# Patient Record
Sex: FEMALE | Race: WHITE | NOT HISPANIC OR LATINO | URBAN - METROPOLITAN AREA
[De-identification: names, ages, dates, MRNs, and addresses within clinical notes are randomized per-mention and may not be internally consistent; named-entity substitution may affect disease eponyms.]

---

## 2021-08-10 ENCOUNTER — MEDICAL CORRESPONDENCE (OUTPATIENT)
Dept: HEALTH INFORMATION MANAGEMENT | Facility: CLINIC | Age: 71
End: 2021-08-10

## 2021-08-18 ENCOUNTER — REFERRAL (OUTPATIENT)
Dept: TRANSPLANT | Facility: CLINIC | Age: 71
End: 2021-08-18
Attending: STUDENT IN AN ORGANIZED HEALTH CARE EDUCATION/TRAINING PROGRAM

## 2021-08-18 VITALS — HEIGHT: 61 IN | WEIGHT: 180 LBS | BODY MASS INDEX: 33.99 KG/M2

## 2021-08-18 DIAGNOSIS — E11.9 DIABETES MELLITUS, TYPE 2 (H): ICD-10-CM

## 2021-08-18 DIAGNOSIS — Z76.82 ORGAN TRANSPLANT CANDIDATE: ICD-10-CM

## 2021-08-18 DIAGNOSIS — N18.4 CHRONIC KIDNEY DISEASE, STAGE IV (SEVERE) (H): ICD-10-CM

## 2021-08-18 DIAGNOSIS — Z01.818 PRE-TRANSPLANT EVALUATION FOR KIDNEY TRANSPLANT: ICD-10-CM

## 2021-08-18 DIAGNOSIS — I10 ESSENTIAL HYPERTENSION: ICD-10-CM

## 2021-08-18 DIAGNOSIS — I73.9 PAD (PERIPHERAL ARTERY DISEASE) (H): ICD-10-CM

## 2021-08-18 DIAGNOSIS — F17.200 CURRENT SMOKER: ICD-10-CM

## 2021-08-18 DIAGNOSIS — E78.5 HYPERLIPIDEMIA: ICD-10-CM

## 2021-08-18 DIAGNOSIS — N18.9 CHRONIC KIDNEY DISEASE: Primary | ICD-10-CM

## 2021-08-18 DIAGNOSIS — Z87.891 HISTORY OF TOBACCO USE: ICD-10-CM

## 2021-08-18 DIAGNOSIS — N18.9 CHRONIC RENAL FAILURE: ICD-10-CM

## 2021-08-18 ASSESSMENT — MIFFLIN-ST. JEOR: SCORE: 1278.85

## 2021-08-18 NOTE — TELEPHONE ENCOUNTER
PCP: SILVER ALARCON   Referring Provider: WHITNEY MOROCHO  Referring Diagnosis: CKD stage 4    GFR/Date: 16 (7/13/21)    Is patient under the age of 65? N  Is patient diabetic? Y  Is patient on insulin? Y  Was patient offered a pancreas transplant referral? N    Is patient in a group home/assisted living? N  Does patient have a guardian? N    Referral intake process completed.  Patient is aware that after financial approval is received, medical records will be requested.   Patient confirmed for a callback from transplant coordinator on 8/27/21. (within 2 weeks)  Tentative evaluation date 9/29/21. (within 4 weeks)    Confirmed coordinator will discuss evaluation process in more detail at the time of their call.   Patient is aware of the need to arrange age appropriate cancer screening, vaccinations, and dental care.  Reminded patient to complete questionnaire, complete medical records release, and review packet prior to evaluation visit .  Assessed patient for special needs (ie-wheelchair, assistance, guardian, and ):  No   Patient instructed to call [phone] with questions.     Patient gave verbal consent during intake call to obtain medical records and documents outside of [facility]:  YES

## 2021-08-18 NOTE — Clinical Note
Please see smart set orders for pt's pre K alone eval. Pt has confirmed she will attend virtual STD on 09/29/2021 - please just send schedule via My Chart. You do not need to call this patient. Thanks, Nohelia

## 2021-08-18 NOTE — LETTER
August 18, 2021    Sumi Zuñiga  [address] [address]      Dear Sumi,    Thank you for your interest in the Transplant Center at [facility]. We look forward to being a part of your care team and assisting you through the transplant process.    As we discussed, your transplant coordinator is Nohelia Fitzgerald (Kidney).  You may call your coordinator at any time with questions or concerns.  Your first scheduled call will be on 8/27/21 between 8-12pm.  If this needs to change, call [phone].    Please complete the following.    1. Fill out and return the enclosed forms    Authorization for Care Everywhere Release of Information    2. Sign up for:    "[employer] access to your electronic medical record (see enclosed pamphlet)    [email or web address], a transplant education website    You can use these tools to learn more about your transplant, communicate with your care team, and track your medical details      Sincerely,      Solid Organ Transplant  [facility]    cc: Referring Physician Dialysis Unit PCP Care Team

## 2021-08-20 ENCOUNTER — DOCUMENTATION ONLY (OUTPATIENT)
Dept: TRANSPLANT | Facility: CLINIC | Age: 71
End: 2021-08-20

## 2021-08-27 NOTE — TELEPHONE ENCOUNTER
Reviewed records for purpose of pre kidney transplant evaluation. Patient has CKD in the setting of diabetes type 2 - insulin dependent. No kidney biopsy found.  Other medical issues include severe peripheral vascular disease with h/o right calf claudication with popliteal artery stent placed 03/2009, left leg stent placed 05/2012. Patient again  experiencing claudication symptoms last fall so abdominal aortogram done 10/01/2020 with stentings/angioplasty done then LE angiogram done 10/09/2020 with further angioplasties. Is on chronic plavix/asprin. No mention of chronic heart issues, last stress test done 2012 - normal. No mention of chronic lung issues and no pulmonary related records found. Smoking x last 40 years - started when she was 20 years old. Dermatology appt 07/14/2021 for LE skin lesion - biopsied with results of porokeratosis/actinic keratosis - ? history of skin cancer. History of adrenal gland nodule - 2009.  Has cholethiasis without evidence of cholecystitis on abd US 07/14/2021. Mammogram 03/2021. Last PAP 2017. Colonoscopy done 2007 - result not available but would likely need repeat due to > 10 years ago. BMI is 35.5. Peripheral vascular disease is a major concern for transplant, but all okay at this time to proceed with pre kidney transplant evaluation.     Contacted patient and introduced myself as their Transplant Coordinator, also introduced the role of the Transplant Coordinator in the transplant process.  Explained the purpose of this call including reviewing next steps and answering questions.  Patient confirmed she is interested in proceeding with pre kidney transplant evaluation. Patient explained she was diagnosed with diabetes in her late 40's and has been on insulin for the last 10 years. States she currently takes a total of 138 units per 24 hours on average.  Patient explained she started smoking when she was 20 years old and remains smoking. Pt stating she does not have any lung  problems at all. Pt stating she does not have any heart issues and has never had and angiogram. Patient stating she has been told she does not need PAP smears any more.   Confirmed Referring Provider, Dialysis Center, and Primary Care Physician. Notified patient of the importance of continued communication with referring providers and primary care physicians.    Reviewed components of transplant evaluation process including necessary appointments, tests, and procedures.    Answered questions for patient regarding evaluation, provided my name and contact information and requested they call with any additional questions.    Determined that patient would like additional information regarding transplant by:     Drop Down choices: Mail, Email, MyChart, Phone Call   Encourage MyChart   Patient confirmed she wants to keep virtual STD of 09/29/2021. Explained a  will send her eval schedule via My Chart. Explained she will receive an email from Tess in our Office with 2 consents and patient educational materials - instructed to read all and I will review consents when I call her with the outcome of the Selection Committee review on October 6. Instructed patient on use of My Transplant Place and to view pre kidney eval parts 1 and 2. Patient expressed very good understanding of all and was in good agreement with the plan.   Smart set orders into Epic today for pre kidney alone eval - routed to .

## 2021-09-19 ENCOUNTER — HEALTH MAINTENANCE LETTER (OUTPATIENT)
Age: 71
End: 2021-09-19

## 2021-09-28 NOTE — PROGRESS NOTES
TRANSPLANT NEPHROLOGY RECIPIENT EVALUATION NOTE    Assessment and Plan:  # Kidney/Pancreas Transplant Evaluation: Patient is a marginal candidate overall. Benefits of a living donor transplant were discussed.    # CKD from presumed  diabetes mellitus type 2 and vascular disease: GFR 14, when ready she may benefit from a kidney transplant.     # DM Type 2: borderline control using 140 units of insulin daily.    # Cardiac Risk: no known history of cardiac disease.  Would need cardiac risk assessment and coronary angiogram given 20+ years of diabetes and other longstanding risk factors.    # Tobacco abuse: with a 50-pack-year history, still smoking 1 ppd. Would need low dose CT chest with PCP and PFTs.     # PAD:  s/p stenting to right popliteal artery in 2009, left SFA in 2012 and right popliteal / left popliteal/SFA in 10/2020. Multiple plasties as well. Previously on Plavix. + ongoing claudication sxs.  Will request most recent outside imaging.     # Actinic keratosis: should continue to follow with derm.     # Inactive adrenal nodule (1.4 x 1.6 cm ): size unchanged since 2009, with recent renin, aldosterone levels and 24 hour urine free cortisol all unremarkable. No further work up per endocrine.     # BMI 36 with central obesity.     #Frailty screening: would need in person surgery visit and frailty testing.     # Health Maintenance: Colonoscopy: Not up to date, Mammogram: Up to date, PAP: Not indicated and Dental: Not up to date    Discussed the risks and benefits of a transplant, including the risk of surgery and immunosuppression medications.  Patient's overall evaluation will be discussed in the Transplant Program's regular meeting with a final recommendation on the patients suitability for transplant to be made at that time.    Pending completion of the full evaluation, patient presently appears to be enough of an acceptable kidney transplant recipient candidate to have any potential kidney donors start the  evaluation process.  Patient was seen in conjunction with Dr. Larry Sarkar as part of a shared visit.    Evaluation:  Sumi Zuñiga was seen in consultation at the request of Dr. John Box for evaluation as a potential kidney/pancreas transplant recipient.    Reason for Visit:  Sumi Zuñiga is a 71 year old female with CKD from diabetes mellitus type 2, who presents for kidney/pancreas transplant evaluation.    History of Present Illness:         Kidney Disease Hx:    Sumi Zuñiga is a 71-year-old female with history of CKD secondary to presumed type 2 diabetes and renal vascular disease. CKD since 2016 and proteinuria that progressed from 10 to 18+ g/g last fall.  Negative serologies include SPEP and PLA2R. GFR less than 20 since 5/2021, more recently 14. She started an evaluation process with [facility] but was out of her network for insurance coverage.  She is AB0-B. No potential donors.       Primary Nephrologist: Dr. Dior        H/o Kidney Stones: No       H/o Recurrent/Frequent UTI: No         Diabetic Hx: Type 2        Diagnosis Date:  early 2000s         Medication History: treated with oral medications at first, then changed to insulin in 2009, currently using NPH 44 units before breakfast,  44 units before lunch and 50 units before dinner.        Diabetic Control: Borderline control (HbA1c 7-9%)   Last HbA1c: 7.9% (9/2021), formerly in the 8-10% range.        Hypoglycemic Unawareness: No       End-Organ Damage due to DM: Nephropathy and Peripheral neuropathy          Cardiac/Vascular Disease Risk Factors:        Cardiac Risk Factors: Diabetes, Hypertension, CKD, Peripheral Arterial Disease and Age (Male > 55, Female > 65)       Known CAD: No, stress test done 2012 - normal       Known PAD/Caludication Symptoms:  Yes, s/p stenting to right popliteal artery in 2009 , left SFA in 2012 and right popliteal / left popliteal/SFA in 10/2020. Previously on Plavix. Reports ongoing claudication sxs.       Known  Heart Failure: No       Arrhythmia: No       Pulmonary Hypertension: No       Valvular Disease: No       Other: None         Viral Serology Status       CMV IgG Antibody: Unknown       EBV IgG Antibody: Unknown         Volume Status/Weight:        Volume status: Not assessed       Weight:  BMI above guidelines       BMI: There is no height or weight on file to calculate BMI.         Functional Capacity/Frailty:       Lives with her son in [place].  Able to shop for 30 minutes, limited by back pain and claudication sxs. Denies exertional sxs with a flight of stairs.     Fatigue/Decreased Energy: [] No [x] Yes    Chest Pain or SOB with Exertion: [x] No [] Yes    Significant Weight Change: [x] No [] Yes    Nausea, Vomiting or Diarrhea: [x] No [] Yes    Fever, Sweats or Chills:  [x] No [] Yes    Leg Swelling [x] No [] Yes        History of Cancer: None    Other Significant Medical Issues:   - Tobacco abuse: 50 pack year, still smoking 1 ppd.   - h/o porokeratosis/actinic keratosis  - inactive adrenal nodule (1.4 x 1.6 cm ): sixe unchanged [id number], with recent rein and aldosterone levels and 24 hour urine free cortisol was unremarkable. No further work up per endocrine.   - BMI 36 with central obesity.       # COVID Vaccination Up To Date: Yes    Potential Living Kidney Donors: No    Review of Systems:  A comprehensive review of systems was obtained and negative, except as noted in the HPI or PMH.    Past Medical History:   Medical record was reviewed and PMH was discussed with patient and noted below.  Past Medical History:   Diagnosis Date     Chronic kidney disease      Current smoker     Started smoking at age 20 years old     Diabetes mellitus, type 2 (H)      Hypertension     takes medication     PAD (peripheral artery disease) (H)        Past Social History:   Past Surgical History:   Procedure Laterality Date     ORTHOPEDIC SURGERY  2012    hip replacement R     Personal history of bleeding or anesthesia  problems: No    Family History:  No family history on file.    Personal History:   Social History     Socioeconomic History     Marital status:      Spouse name: Not on file     Number of children: Not on file     Years of education: Not on file     Highest education level: Not on file   Occupational History     Not on file   Tobacco Use     Smoking status: Current Every Day Smoker     Packs/day: 1.00     Types: Cigarettes     Start date: 1969     Smokeless tobacco: Never Used   Substance and Sexual Activity     Alcohol use: Not Currently     Drug use: Not Currently     Sexual activity: Not on file   Other Topics Concern     Parent/sibling w/ CABG, MI or angioplasty before 65F 55M? Not Asked   Social History Narrative     Not on file     Social Determinants of Health     Financial Resource Strain:      Difficulty of Paying Living Expenses:    Food Insecurity:      Worried About Running Out of Food in the Last Year:      Ran Out of Food in the Last Year:    Transportation Needs:      Lack of Transportation (Medical):      Lack of Transportation (Non-Medical):    Physical Activity:      Days of Exercise per Week:      Minutes of Exercise per Session:    Stress:      Feeling of Stress :    Social Connections:      Frequency of Communication with Friends and Family:      Frequency of Social Gatherings with Friends and Family:      Attends Confucianist Services:      Active Member of Clubs or Organizations:      Attends Club or Organization Meetings:      Marital Status:    Intimate Partner Violence:      Fear of Current or Ex-Partner:      Emotionally Abused:      Physically Abused:      Sexually Abused:        Allergies:  Allergies   Allergen Reactions     Sulfa Drugs Itching       Medications:  No current outpatient medications on file.     No current facility-administered medications for this visit.       Vitals:  There were no vitals taken for this visit.    Exam:  GENERAL APPEARANCE: alert and no  distress  HENT: no obvious abnormalities on appearance  RESP: breathing appears unremarkable with normal rate, no audible wheezing or cough and no apparent shortness of breath with conversation  MS: extremities normal - no gross deformities noted  SKIN: no apparent rash and normal skin tone  NEURO: speech is clear with no obvious neurological deficits  PSYCH: mentation appears normal and affect normal    Results:   No results found for this or any previous visit (from the past 336 hour(s)).

## 2021-09-28 NOTE — PROGRESS NOTES
Pt evaluated via billable telephone visit d/t COVID-19 restrictions. Time spent: 15 min    [facility] Solid Organ Transplant  Outpatient MNT: Kidney Transplant Evaluation    Current BMI: 34 (HT 61 in,  lbs/82 kg)- data from 8/18  BMI is within recommendation of <35 for kidney transplant     Time Spent: 15 minutes  Visit Type: Initial   Referring Physician: Charu   Pt accompanied by: self     History of previous txp: none   Dialysis: no    Nutrition Assessment  Appetite: good/baseline   Food allergies/intolerances: none   Living situation: w/ son  Meal prep & grocery shopping: pt does   Previous RD education: no  Issues chewing or swallowing: no  N/V/D/C: no  Food access concerns: no    Vitamins, Supplements, Pertinent Meds: MVI  Herbal Medicines/Supplements: none    Edema: yes    Weight hx: has gained ~8 lbs x 1 year; per surgeon 'lose weight'    Diet Recall  Breakfast 1 hour after waking- peanut butter toast w/ fruit    Lunch Soup (canned, not LS) or leftovers    Dinner Pork chops w/ mashed potatoes; casseroles   Snacks During the day- fruit (grapes, oranges, apple)   Beverages Water, Diet Pepsi (a few cans/day)   Alcohol 2 drinks/year    Dining out A few times/week     Physical Activity  ADLs     Labs  9/2021- K 5.1 Phos 4.8     Nutrition Diagnosis  No nutrition diagnosis identified at this time     Nutrition Intervention  Nutrition education provided:  Discussed sodium intake (low sodium foods and drinks, seasoning food without salt and tips for low sodium diet). Look for lower sodium soups, etc.   Reviewed wnl K levels (high K foods she may need to limit in the future), high phos levels and to reduce dark soda intake vs switch to clear sodas instead.   Discussed borderline BMI and goal for weight loss per surgeon. Pt reports she will 'cut back'; consider including more fruits/veggies into daily regimen.     Reviewed post txp diet guidelines in brief (will review in further detail post  txp):  (1) Review of proper food safety measures d/t immunosuppressant therapy post-op and increased risk for food-borne illness    (2) Avoid the following post txp d/t risk for rejection, unknown effects on the organs, and/or potential interactions with immunosuppressants:  - Herbal, Chinese, holistic, chiropractic, natural, alternative medicines and supplements  - Detoxes and cleanses  - Weight loss pills  - Protein powders or other products with extracts or herbs (ie green tea extract)    (3) Med regimen and possible side effects    Patient Understanding: Pt verbalized understanding of education provided.  Expected Engagement: Good  Follow-Up Plans: PRN     Nutrition Goals  No nutrition goals identified at this time     Kenisha Olivo, RD, LD, CCTD

## 2021-09-29 ENCOUNTER — DOCUMENTATION ONLY (OUTPATIENT)
Dept: TRANSPLANT | Facility: CLINIC | Age: 71
End: 2021-09-29

## 2021-09-29 ENCOUNTER — VIRTUAL VISIT (OUTPATIENT)
Dept: TRANSPLANT | Facility: CLINIC | Age: 71
End: 2021-09-29
Attending: NURSE PRACTITIONER
Payer: COMMERCIAL

## 2021-09-29 DIAGNOSIS — Z01.818 PRE-TRANSPLANT EVALUATION FOR KIDNEY TRANSPLANT: ICD-10-CM

## 2021-09-29 DIAGNOSIS — I73.9 PAD (PERIPHERAL ARTERY DISEASE) (H): ICD-10-CM

## 2021-09-29 DIAGNOSIS — E11.9 DIABETES MELLITUS, TYPE 2 (H): ICD-10-CM

## 2021-09-29 DIAGNOSIS — N18.5 STAGE 5 CHRONIC KIDNEY DISEASE NOT ON CHRONIC DIALYSIS (H): Primary | ICD-10-CM

## 2021-09-29 DIAGNOSIS — I10 ESSENTIAL HYPERTENSION: ICD-10-CM

## 2021-09-29 DIAGNOSIS — E11.22 TYPE 2 DIABETES MELLITUS WITH STAGE 5 CHRONIC KIDNEY DISEASE NOT ON CHRONIC DIALYSIS, WITH LONG-TERM CURRENT USE OF INSULIN (H): ICD-10-CM

## 2021-09-29 DIAGNOSIS — F17.200 CURRENT SMOKER: ICD-10-CM

## 2021-09-29 DIAGNOSIS — Z87.891 HISTORY OF TOBACCO USE: ICD-10-CM

## 2021-09-29 DIAGNOSIS — N18.9 CHRONIC KIDNEY DISEASE: ICD-10-CM

## 2021-09-29 DIAGNOSIS — N18.4 CHRONIC KIDNEY DISEASE, STAGE IV (SEVERE) (H): ICD-10-CM

## 2021-09-29 DIAGNOSIS — N18.9 CHRONIC RENAL FAILURE: ICD-10-CM

## 2021-09-29 DIAGNOSIS — Z76.82 ORGAN TRANSPLANT CANDIDATE: ICD-10-CM

## 2021-09-29 DIAGNOSIS — Z79.4 TYPE 2 DIABETES MELLITUS WITH STAGE 5 CHRONIC KIDNEY DISEASE NOT ON CHRONIC DIALYSIS, WITH LONG-TERM CURRENT USE OF INSULIN (H): ICD-10-CM

## 2021-09-29 DIAGNOSIS — N18.5 STAGE 5 CHRONIC KIDNEY DISEASE NOT ON CHRONIC DIALYSIS (H): ICD-10-CM

## 2021-09-29 DIAGNOSIS — I12.9 HYPERTENSION, RENAL: ICD-10-CM

## 2021-09-29 DIAGNOSIS — E78.5 HYPERLIPIDEMIA, UNSPECIFIED HYPERLIPIDEMIA TYPE: ICD-10-CM

## 2021-09-29 DIAGNOSIS — N18.5 TYPE 2 DIABETES MELLITUS WITH STAGE 5 CHRONIC KIDNEY DISEASE NOT ON CHRONIC DIALYSIS, WITH LONG-TERM CURRENT USE OF INSULIN (H): ICD-10-CM

## 2021-09-29 DIAGNOSIS — N18.5 CHRONIC RENAL FAILURE, STAGE 5 (H): ICD-10-CM

## 2021-09-29 DIAGNOSIS — E78.2 MIXED HYPERLIPIDEMIA: ICD-10-CM

## 2021-09-29 DIAGNOSIS — E78.5 HYPERLIPIDEMIA: ICD-10-CM

## 2021-09-29 DIAGNOSIS — Z11.59 ENCOUNTER FOR SCREENING FOR OTHER VIRAL DISEASES: ICD-10-CM

## 2021-09-29 PROCEDURE — 99205 OFFICE O/P NEW HI 60 MIN: CPT | Mod: 95

## 2021-09-29 PROCEDURE — 99203 OFFICE O/P NEW LOW 30 MIN: CPT | Mod: 95

## 2021-09-29 RX ORDER — FUROSEMIDE 40 MG
40 TABLET ORAL
COMMUNITY
Start: 2021-09-02 | End: 2022-01-01

## 2021-09-29 RX ORDER — ASPIRIN 81 MG/1
TABLET, CHEWABLE ORAL
COMMUNITY

## 2021-09-29 RX ORDER — BLOOD PRESSURE TEST KIT
KIT MISCELLANEOUS
COMMUNITY
Start: 2020-08-27

## 2021-09-29 RX ORDER — AMLODIPINE BESYLATE 10 MG/1
TABLET ORAL
COMMUNITY
Start: 2021-03-01

## 2021-09-29 RX ORDER — MULTIVITAMIN
TABLET ORAL
COMMUNITY
End: 2023-01-01

## 2021-09-29 RX ORDER — METOPROLOL SUCCINATE 200 MG/1
200 TABLET, EXTENDED RELEASE ORAL
COMMUNITY
Start: 2021-05-19 | End: 2022-05-19

## 2021-09-29 RX ORDER — ATORVASTATIN CALCIUM 80 MG/1
TABLET, FILM COATED ORAL
COMMUNITY
Start: 2021-05-13

## 2021-09-29 RX ORDER — LISINOPRIL 40 MG/1
40 TABLET ORAL
COMMUNITY
Start: 2020-11-24 | End: 2023-01-01

## 2021-09-29 NOTE — LETTER
2021         RE: Sumi Zuñiga  [address] [address]        Dear Colleague,    Thank you for referring your patient, Sumi Zuñiga, to the [facility] TRANSPLANT [facility]. Please see a copy of my visit note below.    Psychosocial Assessment  Patient Name/ Age: Sumi Zuñiga 71 year old   Medical Record #: [id number]  Duration of Interview:     30  min  Process:   Phone Interview                (counseling < 50%)   Present at Appointment: Sumi        :TYRA Forbes, [facility] Date:  2021        Type of transplant: Kidney    Donor type:   Sumi indicated she does not know of any potential donors at this time.   Cadaver   Prior Transplants:    No Status of Transplant:       Current Living Situation    Location:   [address] [place] [id number]  With Whom: Karlos Marshall (42)       Family/ Social Support:    Sumi indicated she has two brothers and one sister who live in [place], [place].   Available, helpful   Committed relationship:  Sumi indicated she is  and her ex- is .   Single   Other supports:   Friends Available, helpful       Activities/ Functional Ability    Current level:  Sumi indicated she wears corrective lenses for reading.   Ambulatory, visually impaired and independent with ADL's     Transportation Drives self       Vocational/Employment/Financial     Employment   Retired   Job Description      Income   [facility]   Insurance  BC/BS Medicare Advantage Plan including Part D.  Sumi indicated she pays her own premiums and is aware of her $3400 OFP Max.    At this time, patient can afford medication costs:  Yes  Medicare       Medical Status    Current mode of treatment for ESRD None   Complications - Diabetics controlled with insulin. None       Behavioral    Tobacco Use Yes  Chemical Dependency No   Sumi indicated she smokes 3/4th of a pack of cigarettes a day and is working on quitting.   Sumi indicated she may  drink 1-2 times a year.     Psychiatric Impairment No  Sumi indicated she was on medication in the past during her divorce which was helpful at the time.  She is no longer on medication and does not believe she has issues with depression at this time.    Reading ability Good  Education Level: Some College Recent Legal History No              Coping Style/Strategies: Sumi indicated when under stress she will take a bath.     Ability to Adhere to Complex Medical Regime: Yes     Adherence History: Sumi indicated she will usually follow her physician's recommendations.        Education  _X_ Medicare  _X_ Rehabilitation  _X_ Donor issues  _X_ Community resources  _X_ Post discharge housing  _X_ Financial resources  _X_ Medical insurance options  _X_ Psych adjustment  _X_ Family adjustment  _X_ Health Care Directive - Provided Education and Declined Completing at this time.  Sumi indicated she is in agreement with her son making her medical decisions for her if she is unable. Psychosocial Risks of Transplant Reviewed and Discussed:  _X_ Increased stress related to emotional,            family, social, employment or financial           situation  _X_ Affect on work and/or disability benefits  _X_ Affect on future life insurance  _X_ Transplant outcome expectations may           not be met  _X_ Mental Health Risks: anxiety,           depression, PTSD, guilt, grief and           chronic fatigue     Notable Items:   None noted.       Final Evaluation/Assessment   Patient seemed to process information well. Appeared well informed, motivated and able to follow post transplant requirements. Behavior was appropriate during interview. Has adequate income and insurance coverage. Adequate social support. No major contraindications noted for transplant.  At this time patient appears to understand the risks and benefits of transplant.      Recommendation  Acceptable    Selection Criteria Met:  Plan for support Yes   Chemical  Dependence Yes   Smoking Yes   Mental Health Yes   Adequate Finances Yes    Signature: TYRA Forbes, [facility]   Title: Clinical        Again, thank you for allowing me to participate in the care of your patient.        Sincerely,        MERY

## 2021-09-29 NOTE — PROGRESS NOTES
"Kidney Transplant Referral - 8/18/2021    71 year old with CKD recent GFR 17, due to type 2 diabetes (insulin dependent). Significant history of peripheral vascular disease with angioplasty.  Not on dialysis. Updated the provider care team.   Sumigarth Zuñiga attended the pre-transplant patient evaluation VIRTUALLY. The [facility] website pre-transplant modules were viewed;   Content reviewed:    Living Donation and how to access that program     Paired exchange NKR     Kidney Donor Profile Index (KDPI)  Kidney Donor Profile Index (KDPI) \"Donors get scored 1-100 based on their age, sex, race, cause of death and organ function.  This score is a very general predictor of future kidney performance. On average, kidneys with a higher score may not function as long as kidneys with a lower score.  This score does not necessarily predict how this specific kidney will perform.  We use this score plus other information like the creatinine and urine output to help us match kidneys Your surgeon will review KDPI and give recommendations of what is  appropriate for you to consider.\"  Ms. Zuñiga uncertain if she marked the box on the KDPI form. She requests to inga \"NOT Willing\" to take offer >85%.  She is okay with Coordinator marking the box.         Waiting list issues (right to decline without penalty, high PHS risk donors, what to expect when called with an offer)    Hospital experience,  length of stay , need to stay locally post-discharge (2-4 weeks)    Surgical options (with pictures)                             Post-surgery lifting and driving restrictions    Post-transplant routines, frequency of lab work and clinic visits    Need to stay locally post-discharge (2-4 weeks)    Role of Transplant Coordinator    Participants were informed of the benefits of transplant as well as potential risks such as infection, cancer, and death.  The need for total adherence with immunosuppression medications and following " "transplant regimens was stressed.  The overall evaluation/approval/listing process was reviewed.        The patient was provided with the following documents:  What You Need to Know About a Kidney Transplant  Adult Kidney Transplant - A Guide for Patients  SRTR Data Sheet - Kidney  Brochure - Kidney Allocation  Brochure - Multiple Listing and Waiting Time Transfer  What Every Patient Needs to Know (UNOS)  UNOS Facts and Figures  Finding a Donor  [facility] - Quick Start Guide  KDPI Consent  Receipt of Information form    Sumi Zuñiga signed the KDPI form but unsure if she marked the box.  ( she requests NOT Willing to accept >85%) She signed MARIA G   Receipt of Information for Organ Transplant Recipient.\" She was provided Nohelia Fitzgerald's business card and instructed to call with additional questions.      Summary    Team s concerns/comments: 71 year old with CKD recent GFR 17, due to type 2 diabetes (insulin dependent). Significant history of peripheral vascular disease with angioplasty.  Not on dialysis. Updated the provider care team.     Candidacy category: Yellow    Action/Plan:  1.  peripheral arterial disease may need CT Abd/Pelvis    2.  I advised her to stop smoking to possibly improved her PVD.  I told her the team may require PFT's and CT of Lung low residue.   3. Obesity Pt may have to see surgeon in person in [facility], team will inform her  4. Cardiology video visit maybe requested for risk stratificatioin  5. She may need functional capacity assessment, again team will let her know.  6. Team to determine  psychosocial assessment needed.   7. Sumi to watch all the [facility] videos and call Nohelia when done.     Expected Selection Meeting Discussion: October 6, 2021.    Dear Sumi Zuñiga,  [email or web address]    Thank you for taking coordinator call.   Per our telephone conversation, here are the education links we d like you to watch. When completed please call/inform your coordinator, Nohelia " "NATHEN Fitzgerald BSN phone [phone] or [phone].       C[employer]spot ( [employer]) is our Transplant educational program.  We want you to register using Ph[employer]bridge Search engine Online.  [email or web address] In the Pre Transplant Section, please click on the Kidney I tutorial and watch 15 video modules.     If you have a live donor you may watch The Living Donor Information hyperlink below.    MD/surgeon did determine you are a good candidate to receive a live donor kidney.  Donors may proceed to register as donor at the website. [email or web address]  or  [email or web address]    Donor team phone number is [phone] will get a call from the donor team member after they register.  Only if they do not hear from donor team member and they have questions they may call.   Please access The Cribspot  modules by following the below links:  SOT Pre-Transplant Training  Kidney I [email or web address]   SOT Pre-Transplant Training  Kidney II [email or web address]/playlist?[email or web address]     Living Donor Information [email or web address]    We request you watch the video modules, please share with your family or supportive person.   Surgeon note was incomplete at our phone call.  Kidney Donor Profile Index (KDPI)  Definition: \"Kidney Donor Profile Index (KDPI) \"Donors get scored 1-100 based on their age, sex, race, cause of death and organ function.  This score is a very general predictor of future kidney performance. On average, kidneys with a higher score may not function as long as kidneys with a lower score.  This score does not necessarily predict how this specific kidney will perform.  We use this score plus other information like the creatinine and urine output to help us match kidneys.  Our team give you choice to accept greater than 85% " % or  not willing  box to accept  donor score 1-85%.      Our team will discuss your case at Selection committee one week after your evaluation, 2021.  Nohelia Fitzgerald will call you to discuss committee recommendations.     Sincerely Daksha Park RN BSN ([phone])

## 2021-09-29 NOTE — PROGRESS NOTES
Psychosocial Assessment  Patient Name/ Age: Sumi Zuñiga 71 year old   Medical Record #: [id number]  Duration of Interview:     30  min  Process:   Phone Interview                (counseling < 50%)   Present at Appointment: Sumi        :TYRA Forbes, [facility] Date:  2021        Type of transplant: Kidney    Donor type:   Sumi indicated she does not know of any potential donors at this time.   Cadaver   Prior Transplants:    No Status of Transplant:       Current Living Situation    Location:   [address] [place] [id number]  With Whom: Son Rolando (42)       Family/ Social Support:    Sumi indicated she has two brothers and one sister who live in [place], [place].   Available, helpful   Committed relationship:  Sumi indicated she is  and her ex- is .   Single   Other supports:   Friends Available, helpful       Activities/ Functional Ability    Current level:  Sumi indicated she wears corrective lenses for reading.   Ambulatory, visually impaired and independent with ADL's     Transportation Drives self       Vocational/Employment/Financial     Employment   Retired   Job Description      Income   [facility] senior living   Insurance  BC/BS Medicare Advantage Plan including Part D.  Sumi indicated she pays her own premiums and is aware of her $3400 OFP Max.    At this time, patient can afford medication costs:  Yes  Medicare       Medical Status    Current mode of treatment for ESRD None   Complications - Diabetics controlled with insulin. None       Behavioral    Tobacco Use Yes  Chemical Dependency No   Sumi indicated she smokes 3/4th of a pack of cigarettes a day and is working on quitting.   Sumi indicated she may drink 1-2 times a year.     Psychiatric Impairment No  Sumi indicated she was on medication in the past during her divorce which was helpful at the time.  She is no longer on medication and does not believe she has issues with depression at this  time.    Reading ability Good  Education Level: Some College Recent Legal History No              Coping Style/Strategies: Sumi indicated when under stress she will take a bath.     Ability to Adhere to Complex Medical Regime: Yes     Adherence History: Sumi indicated she will usually follow her physician's recommendations.        Education  _X_ Medicare  _X_ Rehabilitation  _X_ Donor issues  _X_ Community resources  _X_ Post discharge housing  _X_ Financial resources  _X_ Medical insurance options  _X_ Psych adjustment  _X_ Family adjustment  _X_ Health Care Directive - Provided Education and Declined Completing at this time.  Sumi indicated she is in agreement with her son making her medical decisions for her if she is unable. Psychosocial Risks of Transplant Reviewed and Discussed:  _X_ Increased stress related to emotional,            family, social, employment or financial           situation  _X_ Affect on work and/or disability benefits  _X_ Affect on future life insurance  _X_ Transplant outcome expectations may           not be met  _X_ Mental Health Risks: anxiety,           depression, PTSD, guilt, grief and           chronic fatigue     Notable Items:   None noted.       Final Evaluation/Assessment   Patient seemed to process information well. Appeared well informed, motivated and able to follow post transplant requirements. Behavior was appropriate during interview. Has adequate income and insurance coverage. Adequate social support. No major contraindications noted for transplant.  At this time patient appears to understand the risks and benefits of transplant.      Recommendation  Acceptable    Selection Criteria Met:  Plan for support Yes   Chemical Dependence Yes   Smoking Yes   Mental Health Yes   Adequate Finances Yes    Signature: TYRA Forbes, [facility]   Title: Clinical

## 2021-09-29 NOTE — PROGRESS NOTES
USE AMEE      Sumi is a 71 year old who is being evaluated via a billable video visit.      How would you like to obtain your AVS? MyChart  If the video visit is dropped, the invitation should be resent by: Text to cell phone: [phone]  Will anyone else be joining your video visit? No      Video Start Time: 11:30 am  Video-Visit Details    Type of service:  Video Visit    Video End Time:11:55 am     Originating Location (pt. Location): Home    Distant Location (provider location):  [facility]     Platform used for Video Visit: Amee

## 2021-09-29 NOTE — LETTER
9/29/2021         RE: Sumi Zuñiga  [address] [id number]        Dear Colleague,    Thank you for referring your patient, Sumi Zuñiga, to the [facility]. Please see a copy of my visit note below.    USE ALLI Jonas is a 71 year old who is being evaluated via a billable video visit.      How would you like to obtain your AVS? MyChart  If the video visit is dropped, the invitation should be resent by: Text to cell phone: [phone]  Will anyone else be joining your video visit? No      Video Start Time: 11:15AM CST  Video-Visit Details    Type of service:  Video Visit    Video End Time:11:45 AM CST    Originating Location (pt. Location): Home    Distant Location (provider location):  [facility]     Platform used for Video Visit: [facility]      Transplant Surgery Consult Note     Medical record number: [id number]  YOB: 1950,   Consult requested  for evaluation of kidney transplant candidacy.    Assessment and Recommendations:Ms. Zuñiga appears to be a fair candidate for kidney transplantation and has a fair understanding of the risks and benefits of this approach to the management of renal failure. The following issues should be addressed prior to finalizing her transplant candidacy:     PAD/smoking: She has significant lower extremity claudication and is has been treated by an interventional team with multiple plasties and stents. Concerningly, her most recent intervention only provided her temporary short term relief. It is unclear if she has seen a vascular surgeon in consideration of bypass, but she should be referred if that has not yet happened. I am not certain how her interventional team has been proceeding to this point due to lack of records, but it would also seem that repeat contrast exposure in her setting would be very unwise given her impaired kidney function, as well as her lack of relief.    However, she will  likely not qualify for that intervention if she continues to smoke. Indeed, she is very likely to progress to critical limb ischemia if she continues smoking, as well as other vascular catastrophe (heart attack, stroke). As a secondary concern, I am uncertain that she will have an appropriate target for kidney transplant.    She must stop smoking to become a transplant candidate. She should stop smoking regardless to help stave off other imminent healthy problems.     She will need chest CT, PFTs, and coronary angiogram.    We will need to review her outside imaging.    Given her age, comorbidity, and blood type (ABO B), her most likely successful path forward will be with a live donor.     Transplant order: Ms. Zuñiga has Chronic renal failure due to diabetes mellitus type 2 whose condition is not expected to resolve, is expected to progress, and is expected to continue to develop related comorbid conditions.  Recommend he be considered as a candidate for kidney alone.  Cardiology consult for cardiac risk stratification to be ordered: Yes- will need angiogram. Currently not on HD  CT abdomen and pelvis without contrast to be ordered for assessment of vascular targets: No- will obtain outside records first  Transplant listing labs ordered to include HLA, ABOx2, Cr, etc.  Dietician consult ordered: Yes  Social work consult ordered: Yes  Imaging reports reviewed:  Awaiting transfer  Radiology images reviewed:awaiting transfer  Recipient suitable to move forward with work up of living donors:  No      The majority of our visit was spent in counselling, discussing the medical and surgical risks of kidney transplantation. We discussed approximate wait time and how that is influenced by issues such as blood type and sensitization (PRA) and access to a living donor. I contrasted potential waiting time for living vs  donor kidneys from  normal (0-85%) or higher (%) kidney donor profile index (KDPI) donors and  their associated outcomes. I would not recommend this individual to consider kidneys from high KDPI donors. The reason for this decision is best summarized as: likely LD only. Potential surgical complications of kidney transplantation include bleeding, superficial or deep wound complications (infection, hernia, lymphocele), ureteral anastomotic failure (leak or stenosis), graft thrombosis, need for reoperation and other issues such as cardiac complications, pneumonia, deep venous thrombosis, pulmonary embolism, post transplant diabetes and death. The potential for recurrent disease or need for retransplantation was also addressed. We discussed the possible need for ureteral stent (and subsequent removal), and the utility of protocol biopsy and laboratory studies to evaluate for rejection or recurrent disease. We discussed the risk of graft rejection, our center's average graft and patient survival rates, immunosuppression protocols, as well as the potential opportunity to participate in clinical trials.  We also discussed the average length of stay, recovery process, and posttransplant lab and monitoring protocol.  I emphasized the need for strict immunosuppression medication adherence and the potential for complications of immunosuppression such as skin cancer or lymphoma, as well as a very low but not zero risk of donor-derived disease transmission risks (infection, cancer). Ms. Zuñiga asked good questions and her candidacy will be reviewed at our Multidisciplinary Selection Committee. Thank you for the opportunity to participate in Ms. Zuñiga's care.      Total time: 30 minutes  Counselling time: 25 minutes      John Box MD    ---------------------------------------------------------------------------------------------------    HPI: Ms. Zuñiga has Chronic renal failure due to diabetes mellitus type 2. She uses 140 units of insulin daily. She does not have unawareness. She does have neuropathy and very  significant lower extremity claudication, but does not have ulcers or tissue loss at this point. She denies gastropathy or retinopathy.       The patient is not on dialysis.    Has potential kidney donors:  No.  Interested in participation in paired exchange if a donor is willing: Doesn't know     The patient has the following pertinent history:       No    Yes  Dialysis:    []      [] via:       Blood Transfusion                  []      []  Number of units:   Most recently:  Pregnancy:    []      [] Number:       Previous Transplant:  []      [] Details:    Cancer    []      [] Comment:   Kidney stones   []      [] Comment:      Recurrent infections  []      []  Type:                  Bladder dysfunction  []      [] Cause:    Claudication   []      [] Distance:    Previous Amputation  []      [] Cause:     Chronic anticoagulation  []      [] Indication:   Jew  []      []      Past Medical History:   Diagnosis Date     Anemia in stage 5 chronic kidney disease, not on chronic dialysis (H)      Chronic kidney disease      Current smoker     Started smoking at age 20 years old     Diabetes mellitus, type 2 (H)      Hypertension     takes medication     PAD (peripheral artery disease) (H)      Type 2 diabetes mellitus with kidney complication, with long-term current use of insulin (H)      Past Surgical History:   Procedure Laterality Date     ORTHOPEDIC SURGERY  2012    hip replacement R     Family History   Problem Relation Age of Onset     Heart Failure Mother      Myocardial Infarction Mother 72     Diabetes Father      Myocardial Infarction Father 56     Liver Cancer Sister      Diabetes Sister      Diabetes Sister      Diabetes Paternal Grandmother      Social History     Socioeconomic History     Marital status:      Spouse name: Not on file     Number of children: Not on file     Years of education: Not on file     Highest education level: Not on file   Occupational History     Not on file    Tobacco Use     Smoking status: Current Every Day Smoker     Packs/day: 1.00     Types: Cigarettes     Start date: 1969     Smokeless tobacco: Never Used   Substance and Sexual Activity     Alcohol use: Yes     Comment: rare     Drug use: Not Currently     Sexual activity: Not on file   Other Topics Concern     Parent/sibling w/ CABG, MI or angioplasty before 65F 55M? Not Asked   Social History Narrative     Not on file     Social Determinants of Health     Financial Resource Strain:      Difficulty of Paying Living Expenses:    Food Insecurity:      Worried About Running Out of Food in the Last Year:      Ran Out of Food in the Last Year:    Transportation Needs:      Lack of Transportation (Medical):      Lack of Transportation (Non-Medical):    Physical Activity:      Days of Exercise per Week:      Minutes of Exercise per Session:    Stress:      Feeling of Stress :    Social Connections:      Frequency of Communication with Friends and Family:      Frequency of Social Gatherings with Friends and Family:      Attends Jehovah's witness Services:      Active Member of Clubs or Organizations:      Attends Club or Organization Meetings:      Marital Status:    Intimate Partner Violence:      Fear of Current or Ex-Partner:      Emotionally Abused:      Physically Abused:      Sexually Abused:        ROS:   CONSTITUTIONAL:  No fevers or chills  EYES: negative for icterus  ENT:  negative for hearing loss, tinnitus and sore throat  RESPIRATORY:  negative for cough, sputum, dyspnea  CARDIOVASCULAR:  negative for chest pain positve for LE claudication, negative for LE ulcers  GASTROINTESTINAL:  negative for nausea, vomiting, diarrhea or constipation  GENITOURINARY:  negative for incontinence, dysuria, bladder emptying problems  HEME:  No easy bruising  INTEGUMENT:  negative for rash and pruritus  NEURO:  Negative for headache, seizure disorder  Allergies:   Allergies   Allergen Reactions     Sulfa Drugs Itching      Medications:  Prescription Medications as of 10/22/2021       Rx Number Disp Refills Start End Last Dispensed Date Next Fill Date Owning Pharmacy    amLODIPine (NORVASC) 10 MG tablet    3/1/2021    [facility] [address] AT [facility]    Sig: TAKE 1 TABLET BY MOUTH DAILY GENERIC EQUIVALENT FOR NORVASC    Class: Historical    aspirin (ASA) 81 MG chewable tablet        [facility] [address] AT [facility]    Class: Historical    Route: Oral    atorvastatin (LIPITOR) 80 MG tablet    2021    [facility] [place] [id number] [facility]    Sig: TAKE ONE-HALF TABLET BY MOUTH DAILY    Class: Historical    Blood Pressure KIT    2020    [facility] [place] [id number] [facility]    Sig: Monitor blood pressure 3 time per week.    Class: Historical    furosemide (LASIX) 40 MG tablet    2021   [facility] [place] [id number] AT [facility]    Sig: Take 40 mg by mouth    Class: Historical    Route: Oral    insulin NPH-Regular 70/30 (HUMULIN 70/30;NOVOLIN 70/30) (70-30) 100 UNIT/ML vial    2021    [facility]) [place] [id number] [facility]    Si breakfast, 44 lunch, 52 dinner    Class: Historical    lisinopril (ZESTRIL) 40 MG tablet    2020   [facility] [address] [facility]    Sig: Take 40 mg by mouth    Class: Historical    Route: Oral    metoprolol succinate ER (TOPROL-XL) 200 MG 24 hr tablet    2021   [facility]) [facility] [address] AT [facility]    Sig: Take 200 mg by mouth    Class: Historical    Route: Oral    Specialty  Vitamins Products (VITAMINS FOR HAIR) TABS        [facility] [place] - [address] AT [place] [facility]    Class: Historical    Route: Oral        Exam:     There were no vitals taken for this visit.  Appearance: in no apparent distress.   Skin: normal, dry  Eyes:  no redness or discharge.  Sclera anicteric  Head and Neck: Normal, no rashes or jaundice  Respiratory: easy respirations, no audible wheezing.  Abdomen: abdomen rounded, no obvious masses, hernias, or prior incisions  Extremeties: deferred- video visit  Neuro: motor/sensory appear grossly intact  Psychiatric: Normal mood and affect    Diagnostics:   No results found for this or any previous visit (from the past 672 hour(s)).  No results found for: CPRA      Again, thank you for allowing me to participate in the care of your patient.        Sincerely,        MERY

## 2021-09-29 NOTE — PROGRESS NOTES
USE DOXSURESH      Sumi is a 71 year old who is being evaluated via a billable video visit.      How would you like to obtain your AVS? MyChart  If the video visit is dropped, the invitation should be resent by: Text to cell phone: [phone]  Will anyone else be joining your video visit? No      Video Start Time: 11:15AM CST  Video-Visit Details    Type of service:  Video Visit    Video End Time:11:45 AM CST    Originating Location (pt. Location): Home    Distant Location (provider location):  [facility]     Platform used for Video Visit: Amee

## 2021-09-29 NOTE — LETTER
9/29/2021         RE: Sumi Zuñiga  [address] [address]        Dear Colleague,    Thank you for referring your patient, Sumi Zuñiga, to the [facility]. Please see a copy of my visit note below.    TRANSPLANT NEPHROLOGY RECIPIENT EVALUATION NOTE    Assessment and Plan:  # Kidney/Pancreas Transplant Evaluation: Patient is a marginal candidate overall. Benefits of a living donor transplant were discussed.    # CKD from presumed  diabetes mellitus type 2 and vascular disease: GFR 14, when ready she may benefit from a kidney transplant.     # DM Type 2: borderline control using 140 units of insulin daily.    # Cardiac Risk: no known history of cardiac disease.  Would need cardiac risk assessment and coronary angiogram given 20+ years of diabetes and other longstanding risk factors.    # Tobacco abuse: with a 50-pack-year history, still smoking 1 ppd. Would need low dose CT chest with PCP and PFTs.     # PAD:  s/p stenting to right popliteal artery in 2009, left SFA in 2012 and right popliteal / left popliteal/SFA in 10/2020. Multiple plasties as well. Previously on Plavix. + ongoing claudication sxs.  Will request most recent outside imaging.     # Actinic keratosis: should continue to follow with derm.     # Inactive adrenal nodule (1.4 x 1.6 cm ): size unchanged since 2009, with recent renin, aldosterone levels and 24 hour urine free cortisol all unremarkable. No further work up per endocrine.     # BMI 36 with central obesity.     #Frailty screening: would need in person surgery visit and frailty testing.     # Health Maintenance: Colonoscopy: Not up to date, Mammogram: Up to date, PAP: Not indicated and Dental: Not up to date    Discussed the risks and benefits of a transplant, including the risk of surgery and immunosuppression medications.  Patient's overall evaluation will be discussed in the Transplant Program's regular meeting with a final recommendation on the  patients suitability for transplant to be made at that time.    Pending completion of the full evaluation, patient presently appears to be enough of an acceptable kidney transplant recipient candidate to have any potential kidney donors start the evaluation process.  Patient was seen in conjunction with Dr. Larry Sarkar as part of a shared visit.    Evaluation:  Sumi Zuñiga was seen in consultation at the request of Dr. John Box for evaluation as a potential kidney/pancreas transplant recipient.    Reason for Visit:  Sumi Zuñiga is a 71 year old female with CKD from diabetes mellitus type 2, who presents for kidney/pancreas transplant evaluation.    History of Present Illness:         Kidney Disease Hx:    Sumi Zuñiga is a 71-year-old female with history of CKD secondary to presumed type 2 diabetes and renal vascular disease. CKD since 2016 and proteinuria that progressed from 10 to 18+ g/g last fall.  Negative serologies include SPEP and PLA2R. GFR less than 20 since 5/2021, more recently 14. She started an evaluation process with [facility] but was out of her network for insurance coverage.  She is AB0-B. No potential donors.       Primary Nephrologist: Dr. Dior        H/o Kidney Stones: No       H/o Recurrent/Frequent UTI: No         Diabetic Hx: Type 2        Diagnosis Date:  early 2000s         Medication History: treated with oral medications at first, then changed to insulin in 2009, currently using NPH 44 units before breakfast,  44 units before lunch and 50 units before dinner.        Diabetic Control: Borderline control (HbA1c 7-9%)   Last HbA1c: 7.9% (9/2021), formerly in the 8-10% range.        Hypoglycemic Unawareness: No       End-Organ Damage due to DM: Nephropathy and Peripheral neuropathy          Cardiac/Vascular Disease Risk Factors:        Cardiac Risk Factors: Diabetes, Hypertension, CKD, Peripheral Arterial Disease and Age (Male > 55, Female > 65)       Known CAD: No, stress test done  2012 - normal       Known PAD/Caludication Symptoms:  Yes, s/p stenting to right popliteal artery in 2009 , left SFA in 2012 and right popliteal / left popliteal/SFA in 10/2020. Previously on Plavix. Reports ongoing claudication sxs.       Known Heart Failure: No       Arrhythmia: No       Pulmonary Hypertension: No       Valvular Disease: No       Other: None         Viral Serology Status       CMV IgG Antibody: Unknown       EBV IgG Antibody: Unknown         Volume Status/Weight:        Volume status: Not assessed       Weight:  BMI above guidelines       BMI: There is no height or weight on file to calculate BMI.         Functional Capacity/Frailty:       Lives with her son in [place].  Able to shop for 30 minutes, limited by back pain and claudication sxs. Denies exertional sxs with a flight of stairs.     Fatigue/Decreased Energy: [] No [x] Yes    Chest Pain or SOB with Exertion: [x] No [] Yes    Significant Weight Change: [x] No [] Yes    Nausea, Vomiting or Diarrhea: [x] No [] Yes    Fever, Sweats or Chills:  [x] No [] Yes    Leg Swelling [x] No [] Yes        History of Cancer: None    Other Significant Medical Issues:   - Tobacco abuse: 50 pack year, still smoking 1 ppd.   - h/o porokeratosis/actinic keratosis  - inactive adrenal nodule (1.4 x 1.6 cm ): sixe unchanged [id number], with recent rein and aldosterone levels and 24 hour urine free cortisol was unremarkable. No further work up per endocrine.   - BMI 36 with central obesity.       # COVID Vaccination Up To Date: Yes    Potential Living Kidney Donors: No    Review of Systems:  A comprehensive review of systems was obtained and negative, except as noted in the HPI or PMH.    Past Medical History:   Medical record was reviewed and PMH was discussed with patient and noted below.  Past Medical History:   Diagnosis Date     Chronic kidney disease      Current smoker     Started smoking at age 20 years old     Diabetes mellitus, type 2 (H)       Hypertension     takes medication     PAD (peripheral artery disease) (H)        Past Social History:   Past Surgical History:   Procedure Laterality Date     ORTHOPEDIC SURGERY  2012    hip replacement R     Personal history of bleeding or anesthesia problems: No    Family History:  No family history on file.    Personal History:   Social History     Socioeconomic History     Marital status:      Spouse name: Not on file     Number of children: Not on file     Years of education: Not on file     Highest education level: Not on file   Occupational History     Not on file   Tobacco Use     Smoking status: Current Every Day Smoker     Packs/day: 1.00     Types: Cigarettes     Start date: 1969     Smokeless tobacco: Never Used   Substance and Sexual Activity     Alcohol use: Not Currently     Drug use: Not Currently     Sexual activity: Not on file   Other Topics Concern     Parent/sibling w/ CABG, MI or angioplasty before 65F 55M? Not Asked   Social History Narrative     Not on file     Social Determinants of Health     Financial Resource Strain:      Difficulty of Paying Living Expenses:    Food Insecurity:      Worried About Running Out of Food in the Last Year:      Ran Out of Food in the Last Year:    Transportation Needs:      Lack of Transportation (Medical):      Lack of Transportation (Non-Medical):    Physical Activity:      Days of Exercise per Week:      Minutes of Exercise per Session:    Stress:      Feeling of Stress :    Social Connections:      Frequency of Communication with Friends and Family:      Frequency of Social Gatherings with Friends and Family:      Attends Sikh Services:      Active Member of Clubs or Organizations:      Attends Club or Organization Meetings:      Marital Status:    Intimate Partner Violence:      Fear of Current or Ex-Partner:      Emotionally Abused:      Physically Abused:      Sexually Abused:        Allergies:  Allergies   Allergen Reactions     Sulfa  Drugs Itching       Medications:  No current outpatient medications on file.     No current facility-administered medications for this visit.       Vitals:  There were no vitals taken for this visit.    Exam:  GENERAL APPEARANCE: alert and no distress  HENT: no obvious abnormalities on appearance  RESP: breathing appears unremarkable with normal rate, no audible wheezing or cough and no apparent shortness of breath with conversation  MS: extremities normal - no gross deformities noted  SKIN: no apparent rash and normal skin tone  NEURO: speech is clear with no obvious neurological deficits  PSYCH: mentation appears normal and affect normal    Results:   No results found for this or any previous visit (from the past 336 hour(s)).        USE DOXIMITY      Sumi is a 71 year old who is being evaluated via a billable video visit.      How would you like to obtain your AVS? MyChart  If the video visit is dropped, the invitation should be resent by: Text to cell phone: [phone]  Will anyone else be joining your video visit? No      Video Start Time: 11:30 am  Video-Visit Details    Type of service:  Video Visit    Video End Time:11:55 am     Originating Location (pt. Location): Home    Distant Location (provider location):  [facility]     Platform used for Video Visit: Yinaity      Patient was seen by myself, Dr. Larry Sarkar, in conjunction with Arsen Boyer NP as part of a shared visit.    I personally reviewed past medical and surgical history, vital signs, medications and labs.  Present and past medical history, along with significant physical exam findings were all reviewed with BLANCA.    My cho findings:  Sumi Zuñiga is 71 year old, who presents for Kidney transplant evaluation.    Key management decisions made by me and discussed with BLANCA:  1. Advanced CKD secondary to diabetes.  2. Transplant candidacy evaluation.  3. Severe advanced peripheral arterial disease requiring multiple  plasties and stenting complicated by ongoing nicotine use.  4. Nicotine use.  5. Obesity.  6. Cardiovascular risk stratification (above average risk)  7. Borderline functional capacity.  8. Need for formal psychosocial assessment.  Discussion:  Sumi Quiroz is a 71-year-old lady with longstanding history of smoking and peripheral arterial disease complicated by diabetes and obesity. She is now with advanced kidney disease who is interested in kidney transplantation. We discussed her current situation being blood type B without potential live donors and ongoing smoking with severe peripheral arterial disease which all make Sumi's candidacy very marginal at best. At this point, we will request the CT scan from the outside hospital to assess for anatomical suitability. I encouraged Sumi to complete her age-appropriate cancer screening including lung cancer screening. Additionally, I highly encouraged her to quit smoking. With her blood type being be, her weight time is can be very long and at 71 her only option if she is a transplant candidate will be a live donor. We will review Sumi's case in our multidisciplinary meeting for final candidacy decision. Thank you for the consultation.       Again, thank you for allowing me to participate in the care of your patient.        Sincerely,        MERY

## 2021-09-29 NOTE — PROGRESS NOTES
Patient was seen by myself, Dr. Larry Sarkar, in conjunction with Arsen Boyer NP as part of a shared visit.    I personally reviewed past medical and surgical history, vital signs, medications and labs.  Present and past medical history, along with significant physical exam findings were all reviewed with BLANCA.    My cho findings:  Sumi Zuñiga is 71 year old, who presents for Kidney transplant evaluation.    Key management decisions made by me and discussed with BLANCA:  1. Advanced CKD secondary to diabetes.  2. Transplant candidacy evaluation.  3. Severe advanced peripheral arterial disease requiring multiple plasties and stenting complicated by ongoing nicotine use.  4. Nicotine use.  5. Obesity.  6. Cardiovascular risk stratification (above average risk)  7. Borderline functional capacity.  8. Need for formal psychosocial assessment.  Discussion:  Sumi Quiroz is a 71-year-old lady with longstanding history of smoking and peripheral arterial disease complicated by diabetes and obesity. She is now with advanced kidney disease who is interested in kidney transplantation. We discussed her current situation being blood type B without potential live donors and ongoing smoking with severe peripheral arterial disease which all make Sumi's candidacy very marginal at best. At this point, we will request the CT scan from the outside hospital to assess for anatomical suitability. I encouraged Sumi to complete her age-appropriate cancer screening including lung cancer screening. Additionally, I highly encouraged her to quit smoking. With her blood type being be, her weight time is can be very long and at 71 her only option if she is a transplant candidate will be a live donor. We will review Sumi's case in our multidisciplinary meeting for final candidacy decision. Thank you for the consultation.

## 2021-09-29 NOTE — LETTER
9/29/2021         RE: Sumi Zuñiga  [address] [address] [facility] [address]        Dear Colleague,    Thank you for referring your patient, Sumi Zuñiga, to the [facility]. Please see a copy of my visit note below.    Pt evaluated via billable telephone visit d/t COVID-19 restrictions. Time spent: 15 min    [facility] Solid Organ Transplant  Outpatient MNT: Kidney Transplant Evaluation    Current BMI: 34 (HT 61 in,  lbs/82 kg)- data from 8/18  BMI is within recommendation of <35 for kidney transplant     Time Spent: 15 minutes  Visit Type: Initial   Referring Physician: Charu   Pt accompanied by: self     History of previous txp: none   Dialysis: no    Nutrition Assessment  Appetite: good/baseline   Food allergies/intolerances: none   Living situation: w/ son  Meal prep & grocery shopping: pt does   Previous RD education: no  Issues chewing or swallowing: no  N/V/D/C: no  Food access concerns: no    Vitamins, Supplements, Pertinent Meds: MVI  Herbal Medicines/Supplements: none    Edema: yes    Weight hx: has gained ~8 lbs x 1 year; per surgeon 'lose weight'    Diet Recall  Breakfast 1 hour after waking- peanut butter toast w/ fruit    Lunch Soup (canned, not LS) or leftovers    Dinner Pork chops w/ mashed potatoes; casseroles   Snacks During the day- fruit (grapes, oranges, apple)   Beverages Water, Diet Pepsi (a few cans/day)   Alcohol 2 drinks/year    Dining out A few times/week     Physical Activity  ADLs     Labs  9/2021- K 5.1 Phos 4.8     Nutrition Diagnosis  No nutrition diagnosis identified at this time     Nutrition Intervention  Nutrition education provided:  Discussed sodium intake (low sodium foods and drinks, seasoning food without salt and tips for low sodium diet). Look for lower sodium soups, etc.   Reviewed wnl K levels (high K foods she may need to limit in the future), high phos levels and to reduce dark soda intake vs switch to clear sodas instead.    Discussed borderline BMI and goal for weight loss per surgeon. Pt reports she will 'cut back'; consider including more fruits/veggies into daily regimen.     Reviewed post txp diet guidelines in brief (will review in further detail post txp):  (1) Review of proper food safety measures d/t immunosuppressant therapy post-op and increased risk for food-borne illness    (2) Avoid the following post txp d/t risk for rejection, unknown effects on the organs, and/or potential interactions with immunosuppressants:  - Herbal, Chinese, holistic, chiropractic, natural, alternative medicines and supplements  - Detoxes and cleanses  - Weight loss pills  - Protein powders or other products with extracts or herbs (ie green tea extract)    (3) Med regimen and possible side effects    Patient Understanding: Pt verbalized understanding of education provided.  Expected Engagement: Good  Follow-Up Plans: PRN     Nutrition Goals  No nutrition goals identified at this time     Kenisha Olivo, RD, LD, CCTD                                          Again, thank you for allowing me to participate in the care of your patient.        Sincerely,        MERY

## 2021-10-02 PROBLEM — N18.5 STAGE 5 CHRONIC KIDNEY DISEASE NOT ON CHRONIC DIALYSIS (H): Status: ACTIVE | Noted: 2021-10-02

## 2021-10-02 PROBLEM — D63.1 ANEMIA IN STAGE 5 CHRONIC KIDNEY DISEASE, NOT ON CHRONIC DIALYSIS (H): Status: ACTIVE | Noted: 2021-10-02

## 2021-10-06 ENCOUNTER — COMMITTEE REVIEW (OUTPATIENT)
Dept: TRANSPLANT | Facility: CLINIC | Age: 71
End: 2021-10-06

## 2021-10-06 NOTE — COMMITTEE REVIEW
Abdominal Committee Review Note     Evaluation Date: 9/29/2021  Committee Review Date: 10/6/2021    Organ being evaluated for: Kidney    Transplant Phase: Evaluation  Transplant Status: Active    Transplant Coordinator: Nohelia Fitzgerald  Transplant Surgeon: Dr. John Box        Referring Physician: Dr. Eddie Dior    Primary Diagnosis: CKD   Secondary Diagnosis: Diabetes type 2     Committee Review Members:  Nephrology Naresh Medina MD, Larry Sarkar MD   Nutrition Kenisha Olivo, RD   Pharmacy [facility]    - Clinical Radhaalonso Deutsch, [facility]   Transplant Maria Elena Byrnes, RN, Carey Byrd, RN, Nohelia Fitzgerald, RN, Sammi Bae, RN, Lynne Tidwell RN   Transplant Surgery John Box MD       Transplant Eligibility: Irreversible chronic kidney disease treated w/dialysis or expected need for dialysis    Committee Review Decision: Needs Re-presentation    Relative Contraindications: Other, peripheral vascular status    Absolute Contraindications: None    Committee Chair John Box MD verbally attested to the committee's decision.    Committee Discussion Details: Reviewed medical status and evaluation results to date. Concern for PAD history including interventions done. Recommend outside imaging for CT scan, LE angiograms be reviewed by transplant surgeon to determine target vessel suitability before proceeding further with transplant evaluation. Reamaining eval items will include pre tx labs, cxr, ekg, echo, cardiologist consult, needs smoking cessation, low dose CT chest, PFT's, continue derm f/u, weight loss to BMI of 35, health maintenance up to date, in person tx surg, in person nutritionist for frailty assessment, consents for KDPI > 85% and Receipt of Info, review of [facility] education.

## 2021-10-18 ENCOUNTER — TELEPHONE (OUTPATIENT)
Dept: TRANSPLANT | Facility: CLINIC | Age: 71
End: 2021-10-18

## 2021-10-18 NOTE — TELEPHONE ENCOUNTER
Contacted patient to review outcome of selection committee meeting (See selection committee encounter).   Explained to patient that he/she needs to complete all components of the evaluation to be eligible for active status on the waiting list or to proceed with a live donor kidney transplant.   Reviewed next steps based on outcomes: wait for transplant surgeon consult/ review of outside imaging to make sure patient has suitable vessel status for proceeding with kidney transplant. If suitable then will proceed with remainder of eval.   Patient will not be listed (evaluation is not complete), patient will receive:    - An Evaluation Summary Letter generated today in Epic - electronically routed to patient/ providers.   Patient expressed very good understanding of all and was in good agreement with the plan.

## 2021-10-22 NOTE — PROGRESS NOTES
Transplant Surgery Consult Note     Medical record number: [id number]  YOB: 1950,   Consult requested  for evaluation of kidney transplant candidacy.    Assessment and Recommendations:Ms. Zuñiga appears to be a fair candidate for kidney transplantation and has a fair understanding of the risks and benefits of this approach to the management of renal failure. The following issues should be addressed prior to finalizing her transplant candidacy:     PAD/smoking: She has significant lower extremity claudication and is has been treated by an interventional team with multiple plasties and stents. Concerningly, her most recent intervention only provided her temporary short term relief. It is unclear if she has seen a vascular surgeon in consideration of bypass, but she should be referred if that has not yet happened. I am not certain how her interventional team has been proceeding to this point due to lack of records, but it would also seem that repeat contrast exposure in her setting would be very unwise given her impaired kidney function, as well as her lack of relief.    However, she will likely not qualify for that intervention if she continues to smoke. Indeed, she is very likely to progress to critical limb ischemia if she continues smoking, as well as other vascular catastrophe (heart attack, stroke). As a secondary concern, I am uncertain that she will have an appropriate target for kidney transplant.    She must stop smoking to become a transplant candidate. She should stop smoking regardless to help stave off other imminent healthy problems.     She will need chest CT, PFTs, and coronary angiogram.    We will need to review her outside imaging.    Given her age, comorbidity, and blood type (ABO B), her most likely successful path forward will be with a live donor.     Transplant order: Ms. Zuñiga has Chronic renal failure due to diabetes mellitus type 2 whose condition is not expected to resolve, is  expected to progress, and is expected to continue to develop related comorbid conditions.  Recommend he be considered as a candidate for kidney alone.  Cardiology consult for cardiac risk stratification to be ordered: Yes- will need angiogram. Currently not on HD  CT abdomen and pelvis without contrast to be ordered for assessment of vascular targets: No- will obtain outside records first  Transplant listing labs ordered to include HLA, ABOx2, Cr, etc.  Dietician consult ordered: Yes  Social work consult ordered: Yes  Imaging reports reviewed:  Awaiting transfer  Radiology images reviewed:awaiting transfer  Recipient suitable to move forward with work up of living donors:  No      The majority of our visit was spent in counselling, discussing the medical and surgical risks of kidney transplantation. We discussed approximate wait time and how that is influenced by issues such as blood type and sensitization (PRA) and access to a living donor. I contrasted potential waiting time for living vs  donor kidneys from  normal (0-85%) or higher (%) kidney donor profile index (KDPI) donors and their associated outcomes. I would not recommend this individual to consider kidneys from high KDPI donors. The reason for this decision is best summarized as: likely LD only. Potential surgical complications of kidney transplantation include bleeding, superficial or deep wound complications (infection, hernia, lymphocele), ureteral anastomotic failure (leak or stenosis), graft thrombosis, need for reoperation and other issues such as cardiac complications, pneumonia, deep venous thrombosis, pulmonary embolism, post transplant diabetes and death. The potential for recurrent disease or need for retransplantation was also addressed. We discussed the possible need for ureteral stent (and subsequent removal), and the utility of protocol biopsy and laboratory studies to evaluate for rejection or recurrent disease. We discussed  the risk of graft rejection, our center's average graft and patient survival rates, immunosuppression protocols, as well as the potential opportunity to participate in clinical trials.  We also discussed the average length of stay, recovery process, and posttransplant lab and monitoring protocol.  I emphasized the need for strict immunosuppression medication adherence and the potential for complications of immunosuppression such as skin cancer or lymphoma, as well as a very low but not zero risk of donor-derived disease transmission risks (infection, cancer). Ms. Zuñiga asked good questions and her candidacy will be reviewed at our Multidisciplinary Selection Committee. Thank you for the opportunity to participate in Ms. Zuñiga's care.      Total time: 30 minutes  Counselling time: 25 minutes      John Box MD    ---------------------------------------------------------------------------------------------------    HPI: Ms. Zuñiga has Chronic renal failure due to diabetes mellitus type 2. She uses 140 units of insulin daily. She does not have unawareness. She does have neuropathy and very significant lower extremity claudication, but does not have ulcers or tissue loss at this point. She denies gastropathy or retinopathy.       The patient is not on dialysis.    Has potential kidney donors:  No.  Interested in participation in paired exchange if a donor is willing: Doesn't know     The patient has the following pertinent history:       No    Yes  Dialysis:    []      [] via:       Blood Transfusion                  []      []  Number of units:   Most recently:  Pregnancy:    []      [] Number:       Previous Transplant:  []      [] Details:    Cancer    []      [] Comment:   Kidney stones   []      [] Comment:      Recurrent infections  []      []  Type:                  Bladder dysfunction  []      [] Cause:    Claudication   []      [] Distance:    Previous Amputation  []      [] Cause:     Chronic  anticoagulation  []      [] Indication:   Hoahaoism  []      []      Past Medical History:   Diagnosis Date     Anemia in stage 5 chronic kidney disease, not on chronic dialysis (H)      Chronic kidney disease      Current smoker     Started smoking at age 20 years old     Diabetes mellitus, type 2 (H)      Hypertension     takes medication     PAD (peripheral artery disease) (H)      Type 2 diabetes mellitus with kidney complication, with long-term current use of insulin (H)      Past Surgical History:   Procedure Laterality Date     ORTHOPEDIC SURGERY  2012    hip replacement R     Family History   Problem Relation Age of Onset     Heart Failure Mother      Myocardial Infarction Mother 72     Diabetes Father      Myocardial Infarction Father 56     Liver Cancer Sister      Diabetes Sister      Diabetes Sister      Diabetes Paternal Grandmother      Social History     Socioeconomic History     Marital status:      Spouse name: Not on file     Number of children: Not on file     Years of education: Not on file     Highest education level: Not on file   Occupational History     Not on file   Tobacco Use     Smoking status: Current Every Day Smoker     Packs/day: 1.00     Types: Cigarettes     Start date: 1969     Smokeless tobacco: Never Used   Substance and Sexual Activity     Alcohol use: Yes     Comment: rare     Drug use: Not Currently     Sexual activity: Not on file   Other Topics Concern     Parent/sibling w/ CABG, MI or angioplasty before 65F 55M? Not Asked   Social History Narrative     Not on file     Social Determinants of Health     Financial Resource Strain:      Difficulty of Paying Living Expenses:    Food Insecurity:      Worried About Running Out of Food in the Last Year:      Ran Out of Food in the Last Year:    Transportation Needs:      Lack of Transportation (Medical):      Lack of Transportation (Non-Medical):    Physical Activity:      Days of Exercise per Week:      Minutes of  Exercise per Session:    Stress:      Feeling of Stress :    Social Connections:      Frequency of Communication with Friends and Family:      Frequency of Social Gatherings with Friends and Family:      Attends Religion Services:      Active Member of Clubs or Organizations:      Attends Club or Organization Meetings:      Marital Status:    Intimate Partner Violence:      Fear of Current or Ex-Partner:      Emotionally Abused:      Physically Abused:      Sexually Abused:        ROS:   CONSTITUTIONAL:  No fevers or chills  EYES: negative for icterus  ENT:  negative for hearing loss, tinnitus and sore throat  RESPIRATORY:  negative for cough, sputum, dyspnea  CARDIOVASCULAR:  negative for chest pain positve for LE claudication, negative for LE ulcers  GASTROINTESTINAL:  negative for nausea, vomiting, diarrhea or constipation  GENITOURINARY:  negative for incontinence, dysuria, bladder emptying problems  HEME:  No easy bruising  INTEGUMENT:  negative for rash and pruritus  NEURO:  Negative for headache, seizure disorder  Allergies:   Allergies   Allergen Reactions     Sulfa Drugs Itching     Medications:  Prescription Medications as of 10/22/2021       Rx Number Disp Refills Start End Last Dispensed Date Next Fill Date Owning Pharmacy    amLODIPine (NORVASC) 10 MG tablet    3/1/2021    [facility] (MAIL SERVICE) [place] [id number] [facility]    Sig: TAKE 1 TABLET BY MOUTH DAILY GENERIC EQUIVALENT FOR NORVASC    Class: Historical    aspirin (ASA) 81 MG chewable tablet        [facility] ([facility]) [place] [id number] [facility] [address] [facility] [place]    Class: Historical    Route: Oral    atorvastatin (LIPITOR) 80 MG tablet    5/13/2021    [facility] (MAIL [facility]) [place] [id number] [facility]    Sig: TAKE ONE-HALF TABLET BY MOUTH DAILY    Class: Historical    Blood Pressure KIT    8/27/2020    [facility] (MAIL  SERV[facility]) [facility] [address] AT [facility]    Sig: Monitor blood pressure 3 time per week.    Class: Historical    furosemide (LASIX) 40 MG tablet    2021   [facility] ([facility]) [facility] [address] AT [place] [address]    Sig: Take 40 mg by mouth    Class: Historical    Route: Oral    insulin NPH-Regular 70/30 (HUMULIN 70/30;NOVOLIN 70/30) (70-30) 100 UNIT/ML vial    2021    [facility] (MAIL SERVICE) [facility] [address] AT [place] [address]    Si breakfast, 44 lunch, 52 dinner    Class: Historical    lisinopril (ZESTRIL) 40 MG tablet    2020   [facility] (MAIL SERVICE) [facility] [address] AT [place] [address]    Sig: Take 40 mg by mouth    Class: Historical    Route: Oral    metoprolol succinate ER (TOPROL-XL) 200 MG 24 hr tablet    2021   [facility] (MAIL SERVICE) [facility] [address] AT [place] [address]    Sig: Take 200 mg by mouth    Class: Historical    Route: Oral    Specialty Vitamins Products (VITAMINS FOR HAIR) TABS        [facility] (MAIL SERVICE) [facility] [address] AT [facility]    Class: Historical    Route: Oral        Exam:     There were no vitals taken for this visit.  Appearance: in no apparent distress.   Skin: normal, dry  Eyes:  no redness or discharge.  Sclera anicteric  Head and Neck: Normal, no rashes or jaundice  Respiratory: easy respirations, no audible wheezing.  Abdomen: abdomen rounded, no obvious masses, hernias, or prior incisions  Extremeties: deferred- video visit  Neuro: motor/sensory appear grossly intact  Psychiatric: Normal mood and affect    Diagnostics:   No results found for this or any previous visit (from the past 672 hour(s)).  No results found for: CPRA

## 2021-10-25 ENCOUNTER — TELEPHONE (OUTPATIENT)
Dept: TRANSPLANT | Facility: CLINIC | Age: 71
End: 2021-10-25

## 2021-10-25 NOTE — TELEPHONE ENCOUNTER
Called patient today who confirmed she has only had care at [facility] and [facility] so all records should be in these 2 systems.     Called [facility] - spoke with HIM who confirmed last clinic visit for consultation was on 12- and last radiology visit was on 10- at [facility]. She explained patient did have an appointment scheduled on 09- that patient did not attend for clinic visit. Shared this info today with Dr. Box and Dr. Sarkar.

## 2021-10-27 ENCOUNTER — TELEPHONE (OUTPATIENT)
Dept: TRANSPLANT | Facility: CLINIC | Age: 71
End: 2021-10-27
Payer: COMMERCIAL

## 2021-10-27 DIAGNOSIS — I10 ESSENTIAL (PRIMARY) HYPERTENSION: ICD-10-CM

## 2021-10-27 DIAGNOSIS — N18.9 CHRONIC KIDNEY DISEASE: ICD-10-CM

## 2021-10-27 DIAGNOSIS — Z01.818 PRE-TRANSPLANT EVALUATION FOR KIDNEY TRANSPLANT: Primary | ICD-10-CM

## 2021-10-27 NOTE — TELEPHONE ENCOUNTER
Dr. Box recommends patient proceeds with abd pelvic CT wo contrast and aorto iliac artery US now to evaluate target vessel status for future kidney transplant.     Called patient today and spoke with her. Explained recommendations as above, patient very agreeable to proceed. I explained that hopefully her artery status will be adequate enough to proceed with kidney transplant, however she may find that we recommend something gets fixed first. Pt stating good agreement. Explained a  will call her to make appts.

## 2021-12-28 ENCOUNTER — LAB (OUTPATIENT)
Dept: LAB | Facility: CLINIC | Age: 71
End: 2021-12-28
Attending: NURSE PRACTITIONER
Payer: COMMERCIAL

## 2021-12-28 ENCOUNTER — ANCILLARY PROCEDURE (OUTPATIENT)
Dept: CT IMAGING | Facility: CLINIC | Age: 71
End: 2021-12-28
Attending: NURSE PRACTITIONER
Payer: COMMERCIAL

## 2021-12-28 ENCOUNTER — ANCILLARY PROCEDURE (OUTPATIENT)
Dept: CARDIOLOGY | Facility: CLINIC | Age: 71
End: 2021-12-28
Attending: NURSE PRACTITIONER
Payer: COMMERCIAL

## 2021-12-28 ENCOUNTER — ANCILLARY PROCEDURE (OUTPATIENT)
Dept: GENERAL RADIOLOGY | Facility: CLINIC | Age: 71
End: 2021-12-28
Attending: NURSE PRACTITIONER
Payer: COMMERCIAL

## 2021-12-28 ENCOUNTER — ANCILLARY PROCEDURE (OUTPATIENT)
Dept: ULTRASOUND IMAGING | Facility: CLINIC | Age: 71
End: 2021-12-28
Attending: NURSE PRACTITIONER
Payer: COMMERCIAL

## 2021-12-28 DIAGNOSIS — N18.9 CHRONIC KIDNEY DISEASE: ICD-10-CM

## 2021-12-28 DIAGNOSIS — Z79.4 TYPE 2 DIABETES MELLITUS WITH STAGE 5 CHRONIC KIDNEY DISEASE NOT ON CHRONIC DIALYSIS, WITH LONG-TERM CURRENT USE OF INSULIN (H): ICD-10-CM

## 2021-12-28 DIAGNOSIS — E78.5 HYPERLIPIDEMIA: ICD-10-CM

## 2021-12-28 DIAGNOSIS — I10 ESSENTIAL HYPERTENSION: ICD-10-CM

## 2021-12-28 DIAGNOSIS — I73.9 PAD (PERIPHERAL ARTERY DISEASE) (H): ICD-10-CM

## 2021-12-28 DIAGNOSIS — Z76.82 ORGAN TRANSPLANT CANDIDATE: ICD-10-CM

## 2021-12-28 DIAGNOSIS — N18.4 CHRONIC KIDNEY DISEASE, STAGE IV (SEVERE) (H): ICD-10-CM

## 2021-12-28 DIAGNOSIS — F17.200 CURRENT SMOKER: ICD-10-CM

## 2021-12-28 DIAGNOSIS — Z01.818 PRE-TRANSPLANT EVALUATION FOR KIDNEY TRANSPLANT: ICD-10-CM

## 2021-12-28 DIAGNOSIS — E11.22 TYPE 2 DIABETES MELLITUS WITH STAGE 5 CHRONIC KIDNEY DISEASE NOT ON CHRONIC DIALYSIS, WITH LONG-TERM CURRENT USE OF INSULIN (H): ICD-10-CM

## 2021-12-28 DIAGNOSIS — Z11.59 ENCOUNTER FOR SCREENING FOR OTHER VIRAL DISEASES: ICD-10-CM

## 2021-12-28 DIAGNOSIS — Z87.891 HISTORY OF TOBACCO USE: ICD-10-CM

## 2021-12-28 DIAGNOSIS — N18.5 STAGE 5 CHRONIC KIDNEY DISEASE NOT ON CHRONIC DIALYSIS (H): ICD-10-CM

## 2021-12-28 DIAGNOSIS — N18.9 CHRONIC RENAL FAILURE: ICD-10-CM

## 2021-12-28 DIAGNOSIS — N18.5 TYPE 2 DIABETES MELLITUS WITH STAGE 5 CHRONIC KIDNEY DISEASE NOT ON CHRONIC DIALYSIS, WITH LONG-TERM CURRENT USE OF INSULIN (H): ICD-10-CM

## 2021-12-28 DIAGNOSIS — E11.9 DIABETES MELLITUS, TYPE 2 (H): ICD-10-CM

## 2021-12-28 DIAGNOSIS — I10 ESSENTIAL (PRIMARY) HYPERTENSION: ICD-10-CM

## 2021-12-28 DIAGNOSIS — N18.5 CHRONIC RENAL FAILURE, STAGE 5 (H): ICD-10-CM

## 2021-12-28 LAB
ABO/RH(D): NORMAL
ABO/RH(D): NORMAL
ALBUMIN SERPL-MCNC: 2.2 G/DL (ref 3.4–5)
ALBUMIN UR-MCNC: >499 MG/DL
ALP SERPL-CCNC: 80 U/L (ref 40–150)
ALT SERPL W P-5'-P-CCNC: 20 U/L (ref 0–50)
ANION GAP SERPL CALCULATED.3IONS-SCNC: 7 MMOL/L (ref 3–14)
ANTIBODY SCREEN: NEGATIVE
APPEARANCE UR: ABNORMAL
APTT PPP: 29 SECONDS (ref 22–38)
AST SERPL W P-5'-P-CCNC: 15 U/L (ref 0–45)
BASOPHILS # BLD AUTO: 0.1 10E3/UL (ref 0–0.2)
BASOPHILS NFR BLD AUTO: 1 %
BILIRUB SERPL-MCNC: 0.2 MG/DL (ref 0.2–1.3)
BILIRUB UR QL STRIP: NEGATIVE
BUN SERPL-MCNC: 52 MG/DL (ref 7–30)
CALCIUM SERPL-MCNC: 9 MG/DL (ref 8.5–10.1)
CHLORIDE BLD-SCNC: 115 MMOL/L (ref 94–109)
CMV IGG SERPL IA-ACNC: <0.2 U/ML
CMV IGG SERPL IA-ACNC: NORMAL
CO2 SERPL-SCNC: 20 MMOL/L (ref 20–32)
COLOR UR AUTO: YELLOW
CREAT SERPL-MCNC: 3.27 MG/DL (ref 0.52–1.04)
EBV VCA IGG SER IA-ACNC: >750 U/ML
EBV VCA IGG SER IA-ACNC: POSITIVE
EOSINOPHIL # BLD AUTO: 0.3 10E3/UL (ref 0–0.7)
EOSINOPHIL NFR BLD AUTO: 3 %
ERYTHROCYTE [DISTWIDTH] IN BLOOD BY AUTOMATED COUNT: 14.9 % (ref 10–15)
FACTOR 2 INTERPRETATION: NORMAL
FACTOR V INTERPRETATION: NORMAL
GFR SERPL CREATININE-BSD FRML MDRD: 15 ML/MIN/1.73M2
GLUCOSE BLD-MCNC: 115 MG/DL (ref 70–99)
GLUCOSE UR STRIP-MCNC: 150 MG/DL
HBA1C MFR BLD: 7.4 % (ref 0–5.6)
HBV CORE AB SERPL QL IA: NONREACTIVE
HBV SURFACE AB SERPL IA-ACNC: 0.52 M[IU]/ML
HBV SURFACE AG SERPL QL IA: NONREACTIVE
HCT VFR BLD AUTO: 41.4 % (ref 35–47)
HCV AB SERPL QL IA: NONREACTIVE
HGB BLD-MCNC: 13.2 G/DL (ref 11.7–15.7)
HGB UR QL STRIP: NEGATIVE
HIV 1+2 AB+HIV1 P24 AG SERPL QL IA: NONREACTIVE
IMM GRANULOCYTES # BLD: 0.1 10E3/UL
IMM GRANULOCYTES NFR BLD: 1 %
INR PPP: 0.98 (ref 0.85–1.15)
KETONES UR STRIP-MCNC: NEGATIVE MG/DL
LAB DIRECTOR COMMENTS: NORMAL
LAB DIRECTOR DISCLAIMER: NORMAL
LAB DIRECTOR INTERPRETATION: NORMAL
LAB DIRECTOR METHODOLOGY: NORMAL
LAB DIRECTOR RESULTS: NORMAL
LEUKOCYTE ESTERASE UR QL STRIP: NEGATIVE
LVEF ECHO: NORMAL
LYMPHOCYTES # BLD AUTO: 1.7 10E3/UL (ref 0.8–5.3)
LYMPHOCYTES NFR BLD AUTO: 17 %
MCH RBC QN AUTO: 29.7 PG (ref 26.5–33)
MCHC RBC AUTO-ENTMCNC: 31.9 G/DL (ref 31.5–36.5)
MCV RBC AUTO: 93 FL (ref 78–100)
MONOCYTES # BLD AUTO: 0.8 10E3/UL (ref 0–1.3)
MONOCYTES NFR BLD AUTO: 8 %
NEUTROPHILS # BLD AUTO: 7.1 10E3/UL (ref 1.6–8.3)
NEUTROPHILS NFR BLD AUTO: 70 %
NITRATE UR QL: NEGATIVE
NRBC # BLD AUTO: 0 10E3/UL
NRBC BLD AUTO-RTO: 0 /100
PH UR STRIP: 5 [PH] (ref 5–7)
PLATELET # BLD AUTO: 248 10E3/UL (ref 150–450)
POTASSIUM BLD-SCNC: 4.9 MMOL/L (ref 3.4–5.3)
PROT SERPL-MCNC: 6.4 G/DL (ref 6.8–8.8)
RBC # BLD AUTO: 4.45 10E6/UL (ref 3.8–5.2)
RBC URINE: 2 /HPF
SODIUM SERPL-SCNC: 142 MMOL/L (ref 133–144)
SP GR UR STRIP: 1.02 (ref 1–1.03)
SPECIMEN DESCRIPTION: NORMAL
SPECIMEN EXPIRATION DATE: NORMAL
SPECIMEN EXPIRATION DATE: NORMAL
SQUAMOUS EPITHELIAL: 5 /HPF
T PALLIDUM AB SER QL: NONREACTIVE
UROBILINOGEN UR STRIP-MCNC: NORMAL MG/DL
VZV IGG SER QL IA: 948.9 INDEX
VZV IGG SER QL IA: POSITIVE
WBC # BLD AUTO: 10 10E3/UL (ref 4–11)
WBC URINE: 3 /HPF

## 2021-12-28 PROCEDURE — G0452 MOLECULAR PATHOLOGY INTERPR: HCPCS | Mod: 26 | Performed by: PATHOLOGY

## 2021-12-28 PROCEDURE — 93978 VASCULAR STUDY: CPT | Mod: GC | Performed by: RADIOLOGY

## 2021-12-28 PROCEDURE — 86886 COOMBS TEST INDIRECT TITER: CPT | Performed by: NURSE PRACTITIONER

## 2021-12-28 PROCEDURE — 85390 FIBRINOLYSINS SCREEN I&R: CPT | Mod: 26 | Performed by: PATHOLOGY

## 2021-12-28 PROCEDURE — 85730 THROMBOPLASTIN TIME PARTIAL: CPT | Performed by: PATHOLOGY

## 2021-12-28 PROCEDURE — 83036 HEMOGLOBIN GLYCOSYLATED A1C: CPT | Performed by: PATHOLOGY

## 2021-12-28 PROCEDURE — 93306 TTE W/DOPPLER COMPLETE: CPT | Performed by: INTERNAL MEDICINE

## 2021-12-28 PROCEDURE — 81378 HLA I & II TYPING HR: CPT | Performed by: NURSE PRACTITIONER

## 2021-12-28 PROCEDURE — 85610 PROTHROMBIN TIME: CPT | Performed by: PATHOLOGY

## 2021-12-28 PROCEDURE — 71046 X-RAY EXAM CHEST 2 VIEWS: CPT | Mod: GC | Performed by: RADIOLOGY

## 2021-12-28 PROCEDURE — 87340 HEPATITIS B SURFACE AG IA: CPT | Performed by: NURSE PRACTITIONER

## 2021-12-28 PROCEDURE — 84681 ASSAY OF C-PEPTIDE: CPT | Performed by: NURSE PRACTITIONER

## 2021-12-28 PROCEDURE — 94375 RESPIRATORY FLOW VOLUME LOOP: CPT | Performed by: INTERNAL MEDICINE

## 2021-12-28 PROCEDURE — 86832 HLA CLASS I HIGH DEFIN QUAL: CPT | Performed by: NURSE PRACTITIONER

## 2021-12-28 PROCEDURE — 86901 BLOOD TYPING SEROLOGIC RH(D): CPT | Performed by: NURSE PRACTITIONER

## 2021-12-28 PROCEDURE — 94726 PLETHYSMOGRAPHY LUNG VOLUMES: CPT | Performed by: INTERNAL MEDICINE

## 2021-12-28 PROCEDURE — 86706 HEP B SURFACE ANTIBODY: CPT | Performed by: NURSE PRACTITIONER

## 2021-12-28 PROCEDURE — 86803 HEPATITIS C AB TEST: CPT | Performed by: NURSE PRACTITIONER

## 2021-12-28 PROCEDURE — 36415 COLL VENOUS BLD VENIPUNCTURE: CPT | Performed by: PATHOLOGY

## 2021-12-28 PROCEDURE — 86787 VARICELLA-ZOSTER ANTIBODY: CPT | Performed by: NURSE PRACTITIONER

## 2021-12-28 PROCEDURE — 86147 CARDIOLIPIN ANTIBODY EA IG: CPT | Performed by: NURSE PRACTITIONER

## 2021-12-28 PROCEDURE — 85670 THROMBIN TIME PLASMA: CPT | Performed by: NURSE PRACTITIONER

## 2021-12-28 PROCEDURE — 86644 CMV ANTIBODY: CPT | Performed by: NURSE PRACTITIONER

## 2021-12-28 PROCEDURE — 81240 F2 GENE: CPT | Performed by: NURSE PRACTITIONER

## 2021-12-28 PROCEDURE — 94729 DIFFUSING CAPACITY: CPT | Performed by: INTERNAL MEDICINE

## 2021-12-28 PROCEDURE — 85025 COMPLETE CBC W/AUTO DIFF WBC: CPT | Performed by: PATHOLOGY

## 2021-12-28 PROCEDURE — 85730 THROMBOPLASTIN TIME PARTIAL: CPT | Performed by: NURSE PRACTITIONER

## 2021-12-28 PROCEDURE — 86850 RBC ANTIBODY SCREEN: CPT | Performed by: NURSE PRACTITIONER

## 2021-12-28 PROCEDURE — 86833 HLA CLASS II HIGH DEFIN QUAL: CPT | Performed by: NURSE PRACTITIONER

## 2021-12-28 PROCEDURE — 81001 URINALYSIS AUTO W/SCOPE: CPT | Performed by: PATHOLOGY

## 2021-12-28 PROCEDURE — 86704 HEP B CORE ANTIBODY TOTAL: CPT | Performed by: NURSE PRACTITIONER

## 2021-12-28 PROCEDURE — 74176 CT ABD & PELVIS W/O CONTRAST: CPT | Mod: GC | Performed by: RADIOLOGY

## 2021-12-28 PROCEDURE — 86665 EPSTEIN-BARR CAPSID VCA: CPT | Performed by: NURSE PRACTITIONER

## 2021-12-28 PROCEDURE — 86780 TREPONEMA PALLIDUM: CPT | Performed by: NURSE PRACTITIONER

## 2021-12-28 PROCEDURE — 80053 COMPREHEN METABOLIC PANEL: CPT | Performed by: PATHOLOGY

## 2021-12-28 PROCEDURE — 86481 TB AG RESPONSE T-CELL SUSP: CPT | Performed by: NURSE PRACTITIONER

## 2021-12-29 LAB
C PEPTIDE SERPL-MCNC: 5 NG/ML (ref 0.9–6.9)
CARDIOLIPIN IGG SER IA-ACNC: <2 GPL-U/ML
CARDIOLIPIN IGG SER IA-ACNC: NEGATIVE
CARDIOLIPIN IGM SER IA-ACNC: <2 MPL-U/ML
CARDIOLIPIN IGM SER IA-ACNC: NEGATIVE
DRVVT SCREEN RATIO: 0.92
GAMMA INTERFERON BACKGROUND BLD IA-ACNC: 0.01 IU/ML
LA PPP-IMP: NEGATIVE
LUPUS INTERPRETATION: NORMAL
M TB IFN-G BLD-IMP: NEGATIVE
M TB IFN-G CD4+ BCKGRND COR BLD-ACNC: 7.75 IU/ML
MITOGEN IGNF BCKGRD COR BLD-ACNC: 0 IU/ML
MITOGEN IGNF BCKGRD COR BLD-ACNC: 0.01 IU/ML
PROTOCOL CUTOFF: NORMAL
PTT RATIO: 1.03
QUANTIFERON MITOGEN: 7.76 IU/ML
QUANTIFERON NIL TUBE: 0.01 IU/ML
QUANTIFERON TB1 TUBE: 0.01 IU/ML
QUANTIFERON TB2 TUBE: 0.02
SA 1 CELL: NORMAL
SA 1 TEST METHOD: NORMAL
SA 2 CELL: NORMAL
SA 2 TEST METHOD: NORMAL
SA1 HI RISK ABY: NORMAL
SA1 MOD RISK ABY: NORMAL
SA2 HI RISK ABY: NORMAL
SA2 MOD RISK ABY: NORMAL
THROMBIN TIME: 17.3 SECONDS (ref 13–19)
UNACCEPTABLE ANTIGENS: NORMAL
UNOS CPRA: 0
ZZZSA 1  COMMENTS: NORMAL
ZZZSA 2 COMMENTS: NORMAL

## 2021-12-30 LAB
DLCOCOR-%PRED-PRE: 86 %
DLCOCOR-PRE: 15.21 ML/MIN/MMHG
DLCOUNC-%PRED-PRE: 85 %
DLCOUNC-PRE: 15.12 ML/MIN/MMHG
DLCOUNC-PRED: 17.6 ML/MIN/MMHG
ERV-%PRED-PRE: 301 %
ERV-PRE: 0.65 L
ERV-PRED: 0.22 L
EXPTIME-PRE: 5.81 SEC
FEF2575-%PRED-PRE: 87 %
FEF2575-PRE: 1.56 L/SEC
FEF2575-PRED: 1.78 L/SEC
FEFMAX-%PRED-PRE: 59 %
FEFMAX-PRE: 3.13 L/SEC
FEFMAX-PRED: 5.27 L/SEC
FEV1-%PRED-PRE: 87 %
FEV1-PRE: 1.76 L
FEV1FEV6-PRE: 77 %
FEV1FEV6-PRED: 79 %
FEV1FVC-PRE: 77 %
FEV1FVC-PRED: 79 %
FEV1SVC-PRE: 72 %
FEV1SVC-PRED: 75 %
FIFMAX-PRE: 2.64 L/SEC
FRCPLETH-%PRED-PRE: 97 %
FRCPLETH-PRE: 2.48 L
FRCPLETH-PRED: 2.54 L
FVC-%PRED-PRE: 88 %
FVC-PRE: 2.29 L
FVC-PRED: 2.58 L
IC-%PRED-PRE: 71 %
IC-PRE: 1.79 L
IC-PRED: 2.48 L
RVPLETH-%PRED-PRE: 95 %
RVPLETH-PRE: 1.83 L
RVPLETH-PRED: 1.91 L
TLCPLETH-%PRED-PRE: 96 %
TLCPLETH-PRE: 4.27 L
TLCPLETH-PRED: 4.44 L
VA-%PRED-PRE: 80 %
VA-PRE: 3.35 L
VC-%PRED-PRE: 90 %
VC-PRE: 2.44 L
VC-PRED: 2.7 L

## 2022-01-01 ENCOUNTER — HEALTH MAINTENANCE LETTER (OUTPATIENT)
Age: 72
End: 2022-01-01

## 2022-01-01 ENCOUNTER — TEAM CONFERENCE (OUTPATIENT)
Dept: TRANSPLANT | Facility: CLINIC | Age: 72
End: 2022-01-01

## 2022-01-01 ENCOUNTER — TELEPHONE (OUTPATIENT)
Dept: TRANSPLANT | Facility: CLINIC | Age: 72
End: 2022-01-01

## 2022-01-01 ENCOUNTER — MYC MEDICAL ADVICE (OUTPATIENT)
Dept: TRANSPLANT | Facility: CLINIC | Age: 72
End: 2022-01-01

## 2022-01-01 ENCOUNTER — ANCILLARY PROCEDURE (OUTPATIENT)
Dept: ULTRASOUND IMAGING | Facility: CLINIC | Age: 72
End: 2022-01-01
Attending: NURSE PRACTITIONER
Payer: COMMERCIAL

## 2022-01-01 DIAGNOSIS — Z87.891 HISTORY OF TOBACCO USE: ICD-10-CM

## 2022-01-01 DIAGNOSIS — I10 ESSENTIAL (PRIMARY) HYPERTENSION: ICD-10-CM

## 2022-01-01 DIAGNOSIS — N18.9 CHRONIC KIDNEY DISEASE: ICD-10-CM

## 2022-01-01 DIAGNOSIS — Z01.818 PRE-TRANSPLANT EVALUATION FOR KIDNEY TRANSPLANT: ICD-10-CM

## 2022-01-01 DIAGNOSIS — I73.9 PAD (PERIPHERAL ARTERY DISEASE) (H): ICD-10-CM

## 2022-01-01 DIAGNOSIS — Z76.82 ORGAN TRANSPLANT CANDIDATE: ICD-10-CM

## 2022-01-01 DIAGNOSIS — F17.200 CURRENT SMOKER: ICD-10-CM

## 2022-01-01 DIAGNOSIS — Z01.818 PRE-TRANSPLANT EVALUATION FOR KIDNEY TRANSPLANT: Primary | ICD-10-CM

## 2022-01-01 PROCEDURE — 93922 UPR/L XTREMITY ART 2 LEVELS: CPT | Mod: GC | Performed by: RADIOLOGY

## 2022-01-03 LAB
A*: NORMAL
A*LOCUS SEROLOGIC EQUIVALENT: 2
A*LOCUS: NORMAL
A*SEROLOGIC EQUIVALENT: 3
ABTEST METHOD: NORMAL
B*: NORMAL
B*LOCUS SEROLOGIC EQUIVALENT: 7
B*LOCUS: NORMAL
B*SEROLOGIC EQUIVALENT: 13
BW-1: NORMAL
BW-2: NORMAL
C*: NORMAL
C*LOCUS SEROLOGIC EQUIVALENT: 6
C*LOCUS: NORMAL
C*SEROLOGIC EQUIVALENT: 7
DPA1*: NORMAL
DPB1*: NORMAL
DQA1*: NORMAL
DQA1*LOCUS: NORMAL
DQB1*: NORMAL
DQB1*LOCUS SEROLOGIC EQUIVALENT: 2
DQB1*LOCUS: NORMAL
DQB1*SEROLOGIC EQUIVALENT: 6
DRB1*: NORMAL
DRB1*LOCUS SEROLOGIC EQUIVALENT: 7
DRB1*LOCUS: NORMAL
DRB1*SEROLOGIC EQUIVALENT: 15
DRB4*LOCUS SEROLOGIC EQUIVALENT: 53
DRB4*LOCUS: NORMAL
DRB5*: NORMAL
DRB5*SEROLOGIC EQUIVALENT: 51
DRSSO TEST METHOD: NORMAL

## 2022-05-01 ENCOUNTER — HEALTH MAINTENANCE LETTER (OUTPATIENT)
Age: 72
End: 2022-05-01

## 2022-07-22 ENCOUNTER — TELEPHONE (OUTPATIENT)
Dept: TRANSPLANT | Facility: CLINIC | Age: 72
End: 2022-07-22

## 2022-07-22 NOTE — TELEPHONE ENCOUNTER
Spoke with Dr. Eddie Dior today and explained next step is for tx surgeon review of abd pelvic CT and aorto iliac US which I will do next week. Explained I will call pt and himself back to update.

## 2022-08-10 NOTE — TELEPHONE ENCOUNTER
Reviewed both abdominal pelvic CT and aorto iliac artery ultrasounds done 12/21/2021 with Dr. Aldair Rollins at the Image Review Meeting on July 26. 2022. Dr. Rollins said it looks like patient has a suitable vascular target on the right for kidney transplant, but he recommended patient proceeds with WIN's now.

## 2022-08-10 NOTE — TELEPHONE ENCOUNTER
Called patient today and LM on her VM asking her to call me back. Would like to let her know that she appears to have suitable vessel for kidney transplant and that Transplant Surgeon is now requesting WIN's ( order placed) .     Also sent pt a My Chart Message today with same message.     Also sent pt a letter today with same message.     Called pt's primary nephrologist, Dr. Eddie Dior, today and spoke to him - shared above info. Dr. Dior stating pt is currently in patient and starting on dialysis.

## 2022-08-10 NOTE — LETTER
08/10/22        Sumi Zuñiga  [address] [facility] [address]        Dear Sumi,    Want to notify you that the Transplant Surgeon here does think you have a suitable abdominal artery status to proceed with a  kidney transplant. The Transplant Surgeon is requesting that you now attend an appointment now for WIN's ( ultrasound ) of your lower extremities to help further assess your vascular status. A  from our Office will call you soon to make this appointment. Please let me know when you have completed this appointment as at that point, I will have your pre transplant evaluation overall status reviewed at the Multidisciplinary Selection Committee Meeting for determination of next steps. I will call you after this meeting to share the outcome.       For any questions, please contact myself at the Transplant Office Main Number at [phone] or at My Direct Number at [phone]. .      Sincerely,    Nohelia Fitzgerald, RN, BSN  Pre Kidney Pancreas Transplant Coordinator   [facility]  [facility] Trans[facility]   [address] [place] [address] [address]  [address] [address]  [email or web address]   Office: [phone] Direct Number: [phone]   Fax: [phone]  Employed by [facility]     CC's: Dr. Sara Valenzuela, Dr. Eddie Dior

## 2022-08-22 NOTE — TELEPHONE ENCOUNTER
Have not heard from patient. Called her again today and LM on her VM asking her to call me.  Want to let her know WIN's are ordered.

## 2023-01-01 ENCOUNTER — VIRTUAL VISIT (OUTPATIENT)
Dept: VASCULAR SURGERY | Facility: CLINIC | Age: 73
End: 2023-01-01
Payer: COMMERCIAL

## 2023-01-01 ENCOUNTER — ANCILLARY PROCEDURE (OUTPATIENT)
Dept: ULTRASOUND IMAGING | Facility: CLINIC | Age: 73
End: 2023-01-01
Attending: SURGERY
Payer: COMMERCIAL

## 2023-01-01 ENCOUNTER — TELEPHONE (OUTPATIENT)
Dept: TRANSPLANT | Facility: CLINIC | Age: 73
End: 2023-01-01
Payer: COMMERCIAL

## 2023-01-01 ENCOUNTER — TELEPHONE (OUTPATIENT)
Dept: VASCULAR SURGERY | Facility: CLINIC | Age: 73
End: 2023-01-01
Payer: COMMERCIAL

## 2023-01-01 ENCOUNTER — HEALTH MAINTENANCE LETTER (OUTPATIENT)
Age: 73
End: 2023-01-01

## 2023-01-01 ENCOUNTER — ANCILLARY PROCEDURE (OUTPATIENT)
Dept: CT IMAGING | Facility: CLINIC | Age: 73
End: 2023-01-01
Attending: SURGERY
Payer: COMMERCIAL

## 2023-01-01 DIAGNOSIS — Z79.4 TYPE 2 DIABETES MELLITUS WITH STAGE 5 CHRONIC KIDNEY DISEASE NOT ON CHRONIC DIALYSIS, WITH LONG-TERM CURRENT USE OF INSULIN (H): ICD-10-CM

## 2023-01-01 DIAGNOSIS — E11.22 TYPE 2 DIABETES MELLITUS WITH STAGE 5 CHRONIC KIDNEY DISEASE NOT ON CHRONIC DIALYSIS, WITH LONG-TERM CURRENT USE OF INSULIN (H): ICD-10-CM

## 2023-01-01 DIAGNOSIS — N18.6 ESRD ON DIALYSIS (H): ICD-10-CM

## 2023-01-01 DIAGNOSIS — N18.5 TYPE 2 DIABETES MELLITUS WITH STAGE 5 CHRONIC KIDNEY DISEASE NOT ON CHRONIC DIALYSIS, WITH LONG-TERM CURRENT USE OF INSULIN (H): ICD-10-CM

## 2023-01-01 DIAGNOSIS — I73.9 PAD (PERIPHERAL ARTERY DISEASE) (H): ICD-10-CM

## 2023-01-01 DIAGNOSIS — R09.89 OTHER SPECIFIED SYMPTOMS AND SIGNS INVOLVING THE CIRCULATORY AND RESPIRATORY SYSTEMS: ICD-10-CM

## 2023-01-01 DIAGNOSIS — Z01.818 PRE-TRANSPLANT EVALUATION FOR KIDNEY TRANSPLANT: ICD-10-CM

## 2023-01-01 DIAGNOSIS — N18.6 END STAGE RENAL DISEASE (H): ICD-10-CM

## 2023-01-01 DIAGNOSIS — Z76.82 ORGAN TRANSPLANT CANDIDATE: ICD-10-CM

## 2023-01-01 DIAGNOSIS — Z87.891 HISTORY OF TOBACCO USE: ICD-10-CM

## 2023-01-01 DIAGNOSIS — E11.9 DIABETES MELLITUS, TYPE 2 (H): ICD-10-CM

## 2023-01-01 DIAGNOSIS — I10 ESSENTIAL HYPERTENSION: ICD-10-CM

## 2023-01-01 DIAGNOSIS — Z01.818 PRE-TRANSPLANT EVALUATION FOR KIDNEY TRANSPLANT: Primary | ICD-10-CM

## 2023-01-01 DIAGNOSIS — I25.10 CARDIOVASCULAR DISEASE: ICD-10-CM

## 2023-01-01 DIAGNOSIS — I73.9 PVD (PERIPHERAL VASCULAR DISEASE) (H): ICD-10-CM

## 2023-01-01 DIAGNOSIS — Z99.2 ESRD ON DIALYSIS (H): ICD-10-CM

## 2023-01-01 DIAGNOSIS — I73.9 PAD (PERIPHERAL ARTERY DISEASE) (H): Primary | ICD-10-CM

## 2023-01-01 LAB
CREAT BLD-MCNC: 6 MG/DL (ref 0.5–1)
GFR SERPL CREATININE-BSD FRML MDRD: 7 ML/MIN/1.73M2

## 2023-01-01 PROCEDURE — 75635 CT ANGIO ABDOMINAL ARTERIES: CPT | Performed by: RADIOLOGY

## 2023-01-01 PROCEDURE — 99204 OFFICE O/P NEW MOD 45 MIN: CPT | Mod: VID | Performed by: SURGERY

## 2023-01-01 PROCEDURE — 82565 ASSAY OF CREATININE: CPT | Performed by: PATHOLOGY

## 2023-01-01 PROCEDURE — 93922 UPR/L XTREMITY ART 2 LEVELS: CPT | Performed by: RADIOLOGY

## 2023-01-01 PROCEDURE — 93880 EXTRACRANIAL BILAT STUDY: CPT | Mod: GC | Performed by: RADIOLOGY

## 2023-01-01 RX ORDER — IOPAMIDOL 755 MG/ML
135 INJECTION, SOLUTION INTRAVASCULAR ONCE
Status: COMPLETED | OUTPATIENT
Start: 2023-01-01 | End: 2023-01-01

## 2023-01-01 RX ADMIN — IOPAMIDOL 135 ML: 755 INJECTION, SOLUTION INTRAVASCULAR at 10:08

## 2023-02-03 NOTE — TELEPHONE ENCOUNTER
Called patient today and reached recording stating her VM is full and cannot accept any messages.

## 2023-02-03 NOTE — TELEPHONE ENCOUNTER
Called patient today, reached recording saying the voicemail is full and cannot accept any messages.     Sent pt a My Chart message explaining a  will call her soon to make an appt to see Vascular Surgeon to review her peripheral artery disease status and to make a recommendation to proceed with kidney transplant.     Returned a call to Jaylene PELAYO at [facility] and she had left for the day, but left a message with nurse to give to Jaylene and next step is for pt to see a Vascular Surgeon and  should be calling her soon to make appt.

## 2023-02-06 NOTE — TELEPHONE ENCOUNTER
New vascular referral   Referral Type:  Vascular Surgery  Preferred Location:  [facility]  Reason for Referral:  Pt is in pre kidney transplant evaluation. Please assess peripheral vascular disease and provide a recommendation to proceed with kidney transplant surgeyr.    Referring provider: Arsen Boyer APRN CNP     Most recent imaging?   - August 2022: ABIs:  IMPRESSION:  1. RIGHT:       A. Resting WIN is ABNORMAL, 0.25.       B. Resting TBI is ABNORMAL, 0.13.       C. VPR suggests below knee disease.     2. LEFT:       A. Resting WIN is ABNORMAL, 0.16.       B. Resting TBI is ABNORMAL, 0.06.       C. VPR suggests left iliac and below knee disease.    - December 2021: US aorta/IVC/iliac:  Impression:  RIGHT:  A. Likely mild to moderate stenosis in the distal external iliac  artery is suggested by elevated velocity ratio of 2.5 with peak  systolic velocity of 235 cm/s. Preserved systolic up stroke distally  suggests that this may not be of hemodynamic significance.  B. Limited visualization of the arterial structures due to shadowing  atherosclerotic plaque.  C. Patent common iliac, external iliac, and proximal common femoral  veins.      LEFT:  A. High-grade stenosis in the distal common iliac artery is suggested  by elevated peak systolic velocity of 473 cm/s. This is correlated  with heavily calcified and narrowed vessel on the noncontrast CT from  the same date.  B. Elevated velocity within the left common femoral artery suggesting  least moderate stenosis. Calcified plaque noted on CT from same date.  C. Limited visualization of the arterial structures due to shadowing  atherosclerotic plaque.  D. Patent common iliac, external iliac, and proximal common femoral  veins.     Additional info: 71 y/o female with significant claudication and hx multiple plasties and stents currently being considered for kidney transplant. She is referred to vascular for evaluation  and treatment of her PAD prior to transplant. Most recent ABIs were in August of 2022 and were abnormal (see above). SOT team is wondering if pt may need vascular bypass.     Documentation routed to MD for review.    JULIO Morgan, RN  [facility] Vascular Surgery  Ph: [phone]  Fax: [phone]

## 2023-02-07 NOTE — TELEPHONE ENCOUNTER
Will place in fellow clinic with ABIs prior.    RAMESH MorganN, RN  [facility] - [facility]  Ph: [phone]  Fax: [phone]

## 2023-02-10 NOTE — TELEPHONE ENCOUNTER
----- Message from Parisa Rich RN sent at 2/10/2023  9:17 AM CST -----  Regarding: FW: Referral  Tl Mcallister    Lets get this patient scheduled in Dr Paulson's clinic on 2/22.    See Dr Paulson's message below. I canceled the 3pm patient on 2/22. Can you please also move pt Lincoln Butt to [facility] virtual clinic and cancel his appt with Dr Paulson on 2/22?     Thank you.    Parisa  ----- Message -----  From: Sofia Paulson MD  Sent: 2/9/2023   1:32 PM CST  To: Parisa Rich RN  Subject: RE: Referral                                     Hmm - these ABIs are poor. I probably should see her as the ABIs are in the rest pain range. How about this: Can you reschedule my 930 patient on 2/22 who is just a carotid follow up and have him go to [facility]'s clinic and I will take this patient in a new slot on Wednesday virtually. Also my 3pm on 2/22 can be cancelled as [facility]. Thanks, Sofia  ----- Message -----  From: Parisa Rich RN  Sent: 2/9/2023  11:54 AM CST  To: Sofia Paulson MD  Subject: FW: Referral                                     Tl Paulson,    Would you or [facility] be able to see this pt? It is a pre-transplant workup with abnormal ABIs.    Thanks!    Parisa  ----- Message -----  From: Esvin Graves  Sent: 2/8/2023   5:00 PM CST  To: Parisa Rich RN  Subject: Referral                                         Parisa I called the pt and she states that she is unable to schedule on thursdays with the [facility] because she has dialysis.  Is there another provider that she can be seen by?  Please advise  Esvin Graves on 2/8/2023 at 4:57 PM

## 2023-02-22 NOTE — LETTER
2/22/2023       RE: Sumi Zuñiga  [address] [address]     Dear Colleague,    Thank you for referring your patient, Sumi Zuñiga, to the [facility] [place] at [facility]. Please see a copy of my visit note below.    [facility]      Vascular Surgery Consultation Note     Patient:  Sumi Zuñiga   Date of birth 1950, Medical record number [id number]  Date of Visit:  02/22/2023  Consult Requester:No att. providers found            Assessment and Recommendations:   ASSESSMENT / RECOMMENDATION:    72-year-old female smoker with a history of peripheral arterial disease being worked up for kidney transplant.  At this time I see fairly significant amount of iliac and femoral occlusive disease from a CT scan approximately 1-1/2 years ago.  We will have her repeat a CT angiogram of the abdomen and pelvis with runoff and also obtain a carotid duplex study.  Once we have these results back I will message her on [employer] so she knows how to proceed.  While her duplex last year suggested possible implant in the right pelvis, there is significant disease distal to this which may result in steal and rest pain.  We will await the CTA findings before making any decisions.    Many thanks for involving me in the care of this very pleasant patient. Should any questions or concerns arise, please don't hesitate to contact me.    Warm Regards,    Sofia Paulson MD, DFSVS, RPVI  Director, [facility] Services  Professor and Chief, Vascular and Endovascular Surgery  [facility]  Pager: 1. Send message or 10 digit call back number Securely via [employer] with the Vocera Web Console (learn more here)              2. Outside of [facility]? Call [phone]        60 minutes spent on the date of the encounter doing chart review, review of outside records, review of test results, interpretation of tests, patient  "visit and documentation           HPI: Sumi is a very pleasant 72-year-old 3/4 to 1 pack/day smoker who is being evaluated for kidney transplant.  Part of that work-up was obtaining ABIs to assess vascular disease.  She denies rest pain or tissue loss.  Is able to sleep okay at night without pain in her feet.  She does note claudication in both calves when walking at approximately one half block.  She recalls getting a stent in her legs at approximately 2009.  She denies history of stroke, amaurosis or TIA.      Review of Systems   Constitutional, HEENT, cardiovascular, pulmonary, GI, , musculoskeletal, neuro, skin, endocrine and psych systems are negative, except as otherwise noted.    Physical Exam   GENERAL: Healthy, alert and no distress  EYES: Eyes grossly normal to inspection.  No discharge or erythema, or obvious scleral/conjunctival abnormalities.  RESP: No audible wheeze, cough, or visible cyanosis.  No visible retractions or increased work of breathing.    SKIN: Visible skin clear. No significant rash, abnormal pigmentation or lesions.  NEURO: Cranial nerves grossly intact.  Mentation and speech appropriate for age.  PSYCH: Mentation appears normal, affect normal/bright, judgement and insight intact, normal speech and appearance well-groomed.    Imaging: WIN 0.4 on the right and 0.3 on the left with a history of peripheral stenting.  The right superficial femoral artery stent appears to be occluded on CT scan from 2021    Video Start Time: 12:30 PM  Video End Time: 1 PM          Type of Visit: Virtual: Dasia    Patient is here for a new visit to discuss consult.     Vitals - Patient Reported  Weight (Patient Reported): 83.9 kg (185 lb)  Height (Patient Reported): 154.9 cm (5' 1\")  BMI (Based on Pt Reported Ht/Wt): 34.96  Pain Score: No Pain (0)      Questions patient would like addressed today are: Patient verbalized no questions/concerns, this has been communicated to the provider.     Refills are " needed: No    How would you like to obtain your AVS? Salazar Bonilla Colling        Sincerely,    Sofia Paulson MD

## 2023-02-22 NOTE — NURSING NOTE
Chief Complaint   Patient presents with     Consult       Patient confirms medications and allergies are accurate via patients echeck in completion, and or denies any changes since last reviewed/verified.     Irene Taveras, Virtual Facilitator    Is the patient currently in the state of [place]? YES    Visit mode:VIDEO    If the visit is dropped, the patient can be reconnected by: VIDEO VISIT: Text to cell phone: [phone]    Will anyone else be joining the visit? NO      How would you like to obtain your AVS? MyChart    Are changes needed to the allergy or medication list? NO     Patient states is on dialysis now

## 2023-02-22 NOTE — PATIENT INSTRUCTIONS
Thank you so much for choosing us for your care. It was a pleasure to see you at the vascular clinic today.     Follow-up recommendations: We will get in touch with you after you complete your imaging. No follow-up appt needed at this time.    Additional testing/imaging ordered today: Carotid ultrasound and CT scan w/ leg runoff.      Our scheduling team will get in touch with you to set up any follow-up testing/imaging and/or appointments. Please be aware that any testing/imaging recommended today will need to completed prior to your next visit with the provider. If testing/imaging is not completed prior to your next visit, your visit may be rescheduled.        If you have any questions, please contact our clinic directly at [phone] and ask for the nurse. We also encourage the use of [employer] to communicate with your HealthCare Provider.      If you have an urgent need after business hours (8:00 am to 4:30 pm) please call [phone], option 4, and ask for the vascular attending on call. For non-urgent after hours needs, please call the vascular clinic at [phone]. For scheduling needs, please call our clinic directly at [phone].

## 2023-02-22 NOTE — PROGRESS NOTES
[facility]      Vascular Surgery Consultation Note     Patient:  Sumi Zuñiga   Date of birth 1950, Medical record number [id number]  Date of Visit:  02/22/2023  Consult Requester:No att. providers found            Assessment and Recommendations:   ASSESSMENT / RECOMMENDATION:    72-year-old female smoker with a history of peripheral arterial disease being worked up for kidney transplant.  At this time I see fairly significant amount of iliac and femoral occlusive disease from a CT scan approximately 1-1/2 years ago.  We will have her repeat a CT angiogram of the abdomen and pelvis with runoff and also obtain a carotid duplex study.  Once we have these results back I will message her on [employer] so she knows how to proceed.  While her duplex last year suggested possible implant in the right pelvis, there is significant disease distal to this which may result in steal and rest pain.  We will await the CTA findings before making any decisions.    Many thanks for involving me in the care of this very pleasant patient. Should any questions or concerns arise, please don't hesitate to contact me.    Warm Regards,    Sofia Paulson MD, DFSVS, RPVI  Director, [facility] Services  Professor and Chief, Vascular and Endovascular Surgery  [facility]  Pager: 1. Send message or 10 digit call back number Securely via [employer] with the [employer] Web Console (learn more here)              2. Outside of [facility]? Call [phone]        60 minutes spent on the date of the encounter doing chart review, review of outside records, review of test results, interpretation of tests, patient visit and documentation           HPI: Sumi is a very pleasant 72-year-old 3/4 to 1 pack/day smoker who is being evaluated for kidney transplant.  Part of that work-up was obtaining ABIs to assess vascular disease.  She denies rest pain or tissue loss.  Is able to sleep okay at night without pain in  "her feet.  She does note claudication in both calves when walking at approximately one half block.  She recalls getting a stent in her legs at approximately 2009.  She denies history of stroke, amaurosis or TIA.      Review of Systems   Constitutional, HEENT, cardiovascular, pulmonary, GI, , musculoskeletal, neuro, skin, endocrine and psych systems are negative, except as otherwise noted.    Physical Exam   GENERAL: Healthy, alert and no distress  EYES: Eyes grossly normal to inspection.  No discharge or erythema, or obvious scleral/conjunctival abnormalities.  RESP: No audible wheeze, cough, or visible cyanosis.  No visible retractions or increased work of breathing.    SKIN: Visible skin clear. No significant rash, abnormal pigmentation or lesions.  NEURO: Cranial nerves grossly intact.  Mentation and speech appropriate for age.  PSYCH: Mentation appears normal, affect normal/bright, judgement and insight intact, normal speech and appearance well-groomed.    Imaging: WIN 0.4 on the right and 0.3 on the left with a history of peripheral stenting.  The right superficial femoral artery stent appears to be occluded on CT scan from 2021    Video Start Time: 12:30 PM  Video End Time: 1 PM          Type of Visit: Virtual: Dasia    Patient is here for a new visit to discuss consult.     Vitals - Patient Reported  Weight (Patient Reported): 83.9 kg (185 lb)  Height (Patient Reported): 154.9 cm (5' 1\")  BMI (Based on Pt Reported Ht/Wt): 34.96  Pain Score: No Pain (0)      Questions patient would like addressed today are: Patient verbalized no questions/concerns, this has been communicated to the provider.     Refills are needed: No    How would you like to obtain your AVS? Salazar Taveras  "

## 2023-08-07 NOTE — TELEPHONE ENCOUNTER
Patient Call: General  Route to LPN    Reason for call: needing signature from provider for patient's death certificate. Shannon can provide more details with call. Back.        Call back needed? Yes    Return Call Needed  Same as documented in contacts section  When to return call?: Same day: Route High Priority

## 2023-08-07 NOTE — TELEPHONE ENCOUNTER
I called dialysis today and spoke with them. They explained they did receive a call from pt's family that pt  yesterday at home unexpectedly.     Returned a call to Shannon who asked if Transplant Physician could sign patient's death certificate. She explained pt passed away yesterday. I explained that Transplant Physician saw pt one time 3 years ago so this is not appropriate. Explained pt has a PCP, Dr. Valenzuela, at [facility] and dialyzes at [facility] - provided phone numbers for her to call.     Changed status to dead in EPIC today.

## 2023-10-13 NOTE — LETTER
10/18/21        Sumi Zuñiga  [address] [address]        Dear Sumi,    It was a pleasure to see you recently for consideration of kidney transplantation. Your pre-transplant evaluation results were reviewed at our Multidisciplinary Selection Committee on 10/06/2021. The Committee is requesting the following items are completed before determining your candidacy:    1. Transplant Surgeon to review outside images we currently have and determine if vessel status is suitable to proceed with kidney transplantation or not. If suitable we will proceed with next steps in your evaluation process then. I will call you when the transplant surgeon review is completed.     For any questions, please contact myself at the Transplant Office Main Number at [phone] or at my Direct Number at [phone].      Sincerely,    Nohelia Fitzgerald, RN, BSN  Pre Kidney Pancreas Transplant Coordinator   [facility]  Solid Organ Transplant Care   [address] [place] [id number]  [email or web address]   Office: [phone] Direct Number: [phone]   Fax: [phone] Employed by [employer]     CC's: Dr. Sara Valenzuela, Dr. Eddie Dior         
liquor